# Patient Record
Sex: MALE | URBAN - METROPOLITAN AREA
[De-identification: names, ages, dates, MRNs, and addresses within clinical notes are randomized per-mention and may not be internally consistent; named-entity substitution may affect disease eponyms.]

---

## 2024-02-02 ENCOUNTER — TELEPHONE (OUTPATIENT)
Dept: PRIMARY CARE | Facility: CLINIC | Age: 54
End: 2024-02-02

## 2024-02-02 DIAGNOSIS — Z00.00 WELLNESS EXAMINATION: Primary | ICD-10-CM

## 2024-02-02 PROBLEM — N40.1 BPH WITH OBSTRUCTION/LOWER URINARY TRACT SYMPTOMS: Status: ACTIVE | Noted: 2019-05-20

## 2024-02-02 PROBLEM — N13.8 BPH WITH OBSTRUCTION/LOWER URINARY TRACT SYMPTOMS: Status: ACTIVE | Noted: 2019-05-20

## 2024-02-02 NOTE — TELEPHONE ENCOUNTER
Most recent physical at [facility]: 5/25/2023    I recommend physical for patient sometime in May/June  Fasting lab work has been ordered.    Donovan Gooden MD

## 2024-02-05 NOTE — TELEPHONE ENCOUNTER
Patient was informed to schedule a CPE in May/Liz and to have fasting labs drawn prior.  He will call back to schedule.

## 2024-08-08 RX ORDER — ATENOLOL AND CHLORTHALIDONE TABLET 100; 25 MG/1; MG/1
1 TABLET ORAL
COMMUNITY
Start: 2023-10-05 | End: 2024-08-09 | Stop reason: SDUPTHER

## 2024-08-08 RX ORDER — FENOFIBRATE 145 MG/1
145 TABLET, FILM COATED ORAL
COMMUNITY
Start: 2023-10-05 | End: 2024-08-09 | Stop reason: SDUPTHER

## 2024-08-08 RX ORDER — FLUTICASONE PROPIONATE 50 MCG
SPRAY, SUSPENSION (ML) NASAL
COMMUNITY
Start: 2022-05-23

## 2024-08-08 RX ORDER — TAMSULOSIN HYDROCHLORIDE 0.4 MG/1
0.4 CAPSULE ORAL
COMMUNITY
Start: 2023-10-05 | End: 2024-08-09 | Stop reason: SDUPTHER

## 2024-08-08 RX ORDER — ALBUTEROL SULFATE 90 UG/1
2 INHALANT RESPIRATORY (INHALATION) EVERY 6 HOURS PRN
COMMUNITY
Start: 2019-05-20 | End: 2024-08-09 | Stop reason: SDUPTHER

## 2024-08-08 RX ORDER — SIMVASTATIN 40 MG/1
40 TABLET, FILM COATED ORAL
COMMUNITY
Start: 2023-10-05 | End: 2024-08-09 | Stop reason: SDUPTHER

## 2024-08-08 RX ORDER — KETOCONAZOLE 20 MG/G
1 CREAM TOPICAL 2 TIMES DAILY
COMMUNITY
Start: 2022-05-23

## 2024-08-09 DIAGNOSIS — J45.909 ASTHMA, UNSPECIFIED ASTHMA SEVERITY, UNSPECIFIED WHETHER COMPLICATED, UNSPECIFIED WHETHER PERSISTENT (HHS-HCC): ICD-10-CM

## 2024-08-09 DIAGNOSIS — E78.5 HYPERLIPIDEMIA, UNSPECIFIED HYPERLIPIDEMIA TYPE: ICD-10-CM

## 2024-08-09 DIAGNOSIS — N13.8 BPH WITH OBSTRUCTION/LOWER URINARY TRACT SYMPTOMS: ICD-10-CM

## 2024-08-09 DIAGNOSIS — Z00.00 WELLNESS EXAMINATION: ICD-10-CM

## 2024-08-09 DIAGNOSIS — I10 ESSENTIAL HYPERTENSION, BENIGN: ICD-10-CM

## 2024-08-09 DIAGNOSIS — N40.1 BPH WITH OBSTRUCTION/LOWER URINARY TRACT SYMPTOMS: ICD-10-CM

## 2024-08-09 RX ORDER — FENOFIBRATE 145 MG/1
145 TABLET, FILM COATED ORAL
Qty: 30 TABLET | Refills: 1 | Status: SHIPPED | OUTPATIENT
Start: 2024-08-09

## 2024-08-09 RX ORDER — SIMVASTATIN 40 MG/1
40 TABLET, FILM COATED ORAL
Qty: 30 TABLET | Refills: 1 | Status: SHIPPED | OUTPATIENT
Start: 2024-08-09

## 2024-08-09 RX ORDER — ATENOLOL AND CHLORTHALIDONE TABLET 100; 25 MG/1; MG/1
1 TABLET ORAL DAILY
Qty: 30 TABLET | Refills: 1 | Status: SHIPPED | OUTPATIENT
Start: 2024-08-09

## 2024-08-09 RX ORDER — TAMSULOSIN HYDROCHLORIDE 0.4 MG/1
0.4 CAPSULE ORAL
Qty: 30 CAPSULE | Refills: 1 | Status: SHIPPED | OUTPATIENT
Start: 2024-08-09

## 2024-08-09 RX ORDER — ALBUTEROL SULFATE 90 UG/1
2 INHALANT RESPIRATORY (INHALATION) EVERY 6 HOURS PRN
Qty: 18 G | Refills: 3 | Status: SHIPPED | OUTPATIENT
Start: 2024-08-09

## 2024-09-03 ENCOUNTER — OFFICE VISIT (OUTPATIENT)
Dept: PRIMARY CARE | Facility: CLINIC | Age: 54
End: 2024-09-03
Payer: COMMERCIAL

## 2024-09-03 ENCOUNTER — TELEPHONE (OUTPATIENT)
Dept: PRIMARY CARE | Facility: CLINIC | Age: 54
End: 2024-09-03

## 2024-09-03 VITALS
HEART RATE: 55 BPM | TEMPERATURE: 97.9 F | SYSTOLIC BLOOD PRESSURE: 144 MMHG | DIASTOLIC BLOOD PRESSURE: 86 MMHG | WEIGHT: 213 LBS | OXYGEN SATURATION: 96 %

## 2024-09-03 DIAGNOSIS — N40.1 BPH WITH OBSTRUCTION/LOWER URINARY TRACT SYMPTOMS: ICD-10-CM

## 2024-09-03 DIAGNOSIS — I10 PRIMARY HYPERTENSION: ICD-10-CM

## 2024-09-03 DIAGNOSIS — L02.211 CUTANEOUS ABSCESS OF ABDOMINAL WALL: Primary | ICD-10-CM

## 2024-09-03 DIAGNOSIS — N13.8 BPH WITH OBSTRUCTION/LOWER URINARY TRACT SYMPTOMS: ICD-10-CM

## 2024-09-03 PROCEDURE — 3077F SYST BP >= 140 MM HG: CPT | Performed by: INTERNAL MEDICINE

## 2024-09-03 PROCEDURE — 87070 CULTURE OTHR SPECIMN AEROBIC: CPT

## 2024-09-03 PROCEDURE — 99203 OFFICE O/P NEW LOW 30 MIN: CPT | Performed by: INTERNAL MEDICINE

## 2024-09-03 PROCEDURE — 87075 CULTR BACTERIA EXCEPT BLOOD: CPT

## 2024-09-03 PROCEDURE — 3079F DIAST BP 80-89 MM HG: CPT | Performed by: INTERNAL MEDICINE

## 2024-09-03 RX ORDER — DOXYCYCLINE 100 MG/1
100 CAPSULE ORAL 2 TIMES DAILY
Qty: 20 CAPSULE | Refills: 0 | Status: SHIPPED | OUTPATIENT
Start: 2024-09-03 | End: 2024-09-13

## 2024-09-03 NOTE — TELEPHONE ENCOUNTER
Dennis complains of boils in his groin area.  One started about 1 mo ago and now there's 3 (cluster).  Groin area is red, swollen, very painful.  Please send antibiotics to [facility]. Should I scheduled OV? Call [phone]

## 2024-09-03 NOTE — PROGRESS NOTES
Subjective   Patient ID: Sekou Negron is a 54 y.o. male who presents for Skin Problem (boil).    Same-day visit  (New patient, first visit)    Recurrent cutaneous cysts/furuncles  In the last 2 months, patient has had recurrent cutaneous cysts/pustules involving the groin area.  He has had perhaps 5 or 6 of these episodes.  He has been treating with compresses, drainage, and Neosporin topical antibiotic.  He has a more longstanding history of tinea cruris for which he intermittently uses diluted bleach to manage.  Most recently, cyst/pustule on suprapubic area which was initially the size of a golf ball, then in the last 24 hours drained considerably with relief of pain and reduction in size.  Patient has also noted left nares pustule in the last month, resolved    Essential hypertension  Stable on treatment, no recent change in medications.  Home blood pressure monitoring recommended.  Blood pressure today slightly above goal.  Patient suggests history of whitecoat hypertension    BPH  Current treatment with tamsulosin 0.4 mg daily.  Patient states that his urinary stream remains slow with a significant degree of hesitancy.  Previously, urology has suggested urologic procedure which was initially scheduled, then canceled.  No previous use of finasteride, discussed today.    Migraine headaches  Onset when patient was in his 20s.  Use of atenolol was in part for migraines.  No recent migraines.    Follow-up  Wellness exam/physical in 10/2024  (Fasting lab work to be done 3 to 5 days prior)           Review of Systems   Constitutional:  Negative for chills, fatigue and fever.   Respiratory:  Negative for cough and shortness of breath.    Gastrointestinal:  Negative for constipation and diarrhea.   Genitourinary:  Negative for dysuria and urgency.   Musculoskeletal:  Negative for arthralgias.   Skin:  Positive for wound.   Neurological:  Negative for dizziness and weakness.       Objective   /86 (BP Location:  Left arm, Patient Position: Sitting, BP Cuff Size: Adult)   Pulse 55   Temp 36.6 °C (97.9 °F)   Wt 96.6 kg (213 lb)   SpO2 96%     Physical Exam  Vitals reviewed.   HENT:      Right Ear: Tympanic membrane normal.      Left Ear: Tympanic membrane normal.   Cardiovascular:      Rate and Rhythm: Normal rate and regular rhythm.   Pulmonary:      Effort: Pulmonary effort is normal.      Breath sounds: Normal breath sounds.   Musculoskeletal:      Right lower leg: No edema.      Left lower leg: No edema.   Skin:     Comments: Pustule in suprapubic area with purulent drainage expressed.  Mild rim of erythema.  Moderately tender to palpation.         Assessment/Plan     Recurrent cutaneous cysts/furuncles, currently involving suprapubic area  Swab for bacterial culture sent  Doxycycline 100 mg twice a day for 10 days  Routine skin care including warm compresses as needed, sterile bandage for drainage as needed, daily cleansing with antibacterial soap    Essential hypertension  Continue current medication regimen  Monitor and bring in home blood pressure readings at home blood pressure advised    BPH  Continue tamsulosin 0.4 mg daily  Option of second agent, finasteride 5 mg daily, discussed    Follow-up  Wellness exam/physical in 10/2024  (Fasting lab work to be done 3 to 5 days prior)    Donovan Gooden MD

## 2024-09-03 NOTE — PATIENT INSTRUCTIONS
Recurrent cutaneous cysts/furuncles, currently involving suprapubic area  Swab for bacterial culture sent  Doxycycline 100 mg twice a day for 10 days  Routine skin care including warm compresses as needed, sterile bandage for drainage as needed, daily cleansing with antibacterial soap    Essential hypertension  Continue current medication regimen  Monitor and bring in home blood pressure readings at home blood pressure advised    BPH  Continue tamsulosin 0.4 mg daily  Option of second agent, finasteride 5 mg daily, discussed    Follow-up  Wellness exam/physical in 10/2024  (Fasting lab work to be done 3 to 5 days prior)   normal (ped)...

## 2024-09-04 ASSESSMENT — ENCOUNTER SYMPTOMS
FATIGUE: 0
SHORTNESS OF BREATH: 0
FEVER: 0
CHILLS: 0
DIARRHEA: 0
WOUND: 1
DIZZINESS: 0
ARTHRALGIAS: 0
CONSTIPATION: 0
COUGH: 0
DYSURIA: 0
WEAKNESS: 0

## 2024-09-06 LAB
BACTERIA SPEC CULT: ABNORMAL
GRAM STN SPEC: ABNORMAL
GRAM STN SPEC: ABNORMAL

## 2024-10-08 ENCOUNTER — LAB (OUTPATIENT)
Dept: LAB | Facility: LAB | Age: 54
End: 2024-10-08
Payer: COMMERCIAL

## 2024-10-08 DIAGNOSIS — Z00.00 WELLNESS EXAMINATION: ICD-10-CM

## 2024-10-08 LAB
25(OH)D3 SERPL-MCNC: 26 NG/ML (ref 30–100)
ALBUMIN SERPL BCP-MCNC: 4.8 G/DL (ref 3.4–5)
ALP SERPL-CCNC: 31 U/L (ref 33–120)
ALT SERPL W P-5'-P-CCNC: 26 U/L (ref 10–52)
ANION GAP SERPL CALC-SCNC: 15 MMOL/L (ref 10–20)
APPEARANCE UR: ABNORMAL
AST SERPL W P-5'-P-CCNC: 23 U/L (ref 9–39)
BASOPHILS # BLD AUTO: 0.06 X10*3/UL (ref 0–0.1)
BASOPHILS NFR BLD AUTO: 1 %
BILIRUB SERPL-MCNC: 0.7 MG/DL (ref 0–1.2)
BILIRUB UR STRIP.AUTO-MCNC: NEGATIVE MG/DL
BUN SERPL-MCNC: 18 MG/DL (ref 6–23)
CALCIUM SERPL-MCNC: 10.4 MG/DL (ref 8.6–10.6)
CHLORIDE SERPL-SCNC: 95 MMOL/L (ref 98–107)
CHOLEST SERPL-MCNC: 192 MG/DL (ref 0–199)
CHOLESTEROL/HDL RATIO: 3.6
CO2 SERPL-SCNC: 33 MMOL/L (ref 21–32)
COLOR UR: YELLOW
CREAT SERPL-MCNC: 0.97 MG/DL (ref 0.5–1.3)
CRP SERPL HS-MCNC: 14 MG/L
EGFRCR SERPLBLD CKD-EPI 2021: >90 ML/MIN/1.73M*2
EOSINOPHIL # BLD AUTO: 0.34 X10*3/UL (ref 0–0.7)
EOSINOPHIL NFR BLD AUTO: 5.9 %
ERYTHROCYTE [DISTWIDTH] IN BLOOD BY AUTOMATED COUNT: 13.4 % (ref 11.5–14.5)
EST. AVERAGE GLUCOSE BLD GHB EST-MCNC: 94 MG/DL
GLUCOSE SERPL-MCNC: 100 MG/DL (ref 74–99)
GLUCOSE UR STRIP.AUTO-MCNC: NORMAL MG/DL
HBA1C MFR BLD: 4.9 %
HCT VFR BLD AUTO: 47.4 % (ref 41–52)
HDLC SERPL-MCNC: 53.8 MG/DL
HGB BLD-MCNC: 16.2 G/DL (ref 13.5–17.5)
IMM GRANULOCYTES # BLD AUTO: 0.02 X10*3/UL (ref 0–0.7)
IMM GRANULOCYTES NFR BLD AUTO: 0.3 % (ref 0–0.9)
KETONES UR STRIP.AUTO-MCNC: NEGATIVE MG/DL
LDLC SERPL CALC-MCNC: 121 MG/DL
LEUKOCYTE ESTERASE UR QL STRIP.AUTO: NEGATIVE
LYMPHOCYTES # BLD AUTO: 1.31 X10*3/UL (ref 1.2–4.8)
LYMPHOCYTES NFR BLD AUTO: 22.8 %
MCH RBC QN AUTO: 31.1 PG (ref 26–34)
MCHC RBC AUTO-ENTMCNC: 34.2 G/DL (ref 32–36)
MCV RBC AUTO: 91 FL (ref 80–100)
MONOCYTES # BLD AUTO: 0.58 X10*3/UL (ref 0.1–1)
MONOCYTES NFR BLD AUTO: 10.1 %
MUCOUS THREADS #/AREA URNS AUTO: ABNORMAL /LPF
NEUTROPHILS # BLD AUTO: 3.43 X10*3/UL (ref 1.2–7.7)
NEUTROPHILS NFR BLD AUTO: 59.9 %
NITRITE UR QL STRIP.AUTO: NEGATIVE
NON HDL CHOLESTEROL: 138 MG/DL (ref 0–149)
NRBC BLD-RTO: 0 /100 WBCS (ref 0–0)
PH UR STRIP.AUTO: 7.5 [PH]
PLATELET # BLD AUTO: 208 X10*3/UL (ref 150–450)
POTASSIUM SERPL-SCNC: 3.6 MMOL/L (ref 3.5–5.3)
PROT SERPL-MCNC: 7.8 G/DL (ref 6.4–8.2)
PROT UR STRIP.AUTO-MCNC: ABNORMAL MG/DL
PSA SERPL-MCNC: 1.13 NG/ML
RBC # BLD AUTO: 5.21 X10*6/UL (ref 4.5–5.9)
RBC # UR STRIP.AUTO: NEGATIVE /UL
RBC #/AREA URNS AUTO: ABNORMAL /HPF
SODIUM SERPL-SCNC: 139 MMOL/L (ref 136–145)
SP GR UR STRIP.AUTO: 1.02
TRIGL SERPL-MCNC: 87 MG/DL (ref 0–149)
TSH SERPL-ACNC: 1.21 MIU/L (ref 0.44–3.98)
UROBILINOGEN UR STRIP.AUTO-MCNC: NORMAL MG/DL
VLDL: 17 MG/DL (ref 0–40)
WBC # BLD AUTO: 5.7 X10*3/UL (ref 4.4–11.3)
WBC #/AREA URNS AUTO: ABNORMAL /HPF

## 2024-10-08 PROCEDURE — 85025 COMPLETE CBC W/AUTO DIFF WBC: CPT

## 2024-10-08 PROCEDURE — 84153 ASSAY OF PSA TOTAL: CPT

## 2024-10-08 PROCEDURE — 81001 URINALYSIS AUTO W/SCOPE: CPT

## 2024-10-08 PROCEDURE — 36415 COLL VENOUS BLD VENIPUNCTURE: CPT

## 2024-10-08 PROCEDURE — 84443 ASSAY THYROID STIM HORMONE: CPT

## 2024-10-08 PROCEDURE — 86141 C-REACTIVE PROTEIN HS: CPT

## 2024-10-08 PROCEDURE — 80053 COMPREHEN METABOLIC PANEL: CPT

## 2024-10-08 PROCEDURE — 83036 HEMOGLOBIN GLYCOSYLATED A1C: CPT

## 2024-10-08 PROCEDURE — 80061 LIPID PANEL: CPT

## 2024-10-08 PROCEDURE — 82306 VITAMIN D 25 HYDROXY: CPT

## 2024-10-11 ENCOUNTER — APPOINTMENT (OUTPATIENT)
Dept: PRIMARY CARE | Facility: CLINIC | Age: 54
End: 2024-10-11
Payer: COMMERCIAL

## 2024-10-11 VITALS
BODY MASS INDEX: 29.92 KG/M2 | DIASTOLIC BLOOD PRESSURE: 64 MMHG | HEART RATE: 65 BPM | HEIGHT: 70 IN | WEIGHT: 209 LBS | SYSTOLIC BLOOD PRESSURE: 120 MMHG | OXYGEN SATURATION: 98 %

## 2024-10-11 DIAGNOSIS — D12.6 TUBULAR ADENOMA OF COLON: ICD-10-CM

## 2024-10-11 DIAGNOSIS — G43.109 MIGRAINE WITH AURA AND WITHOUT STATUS MIGRAINOSUS, NOT INTRACTABLE: ICD-10-CM

## 2024-10-11 DIAGNOSIS — Z00.00 WELLNESS EXAMINATION: Primary | ICD-10-CM

## 2024-10-11 DIAGNOSIS — N13.8 BPH WITH OBSTRUCTION/LOWER URINARY TRACT SYMPTOMS: ICD-10-CM

## 2024-10-11 DIAGNOSIS — E55.9 VITAMIN D DEFICIENCY: ICD-10-CM

## 2024-10-11 DIAGNOSIS — I35.1 MILD AORTIC REGURGITATION: ICD-10-CM

## 2024-10-11 DIAGNOSIS — I51.7 ASYMMETRIC SEPTAL HYPERTROPHY: ICD-10-CM

## 2024-10-11 DIAGNOSIS — J06.9 UPPER RESPIRATORY TRACT INFECTION, UNSPECIFIED TYPE: ICD-10-CM

## 2024-10-11 DIAGNOSIS — N40.1 BPH WITH OBSTRUCTION/LOWER URINARY TRACT SYMPTOMS: ICD-10-CM

## 2024-10-11 DIAGNOSIS — J45.20 MILD INTERMITTENT ASTHMA WITHOUT COMPLICATION (HHS-HCC): ICD-10-CM

## 2024-10-11 DIAGNOSIS — Z23 FLU VACCINE NEED: ICD-10-CM

## 2024-10-11 DIAGNOSIS — J30.2 SEASONAL ALLERGIC RHINITIS, UNSPECIFIED TRIGGER: ICD-10-CM

## 2024-10-11 DIAGNOSIS — I10 PRIMARY HYPERTENSION: ICD-10-CM

## 2024-10-11 DIAGNOSIS — R31.29 MICROSCOPIC HEMATURIA: ICD-10-CM

## 2024-10-11 DIAGNOSIS — E78.2 MIXED HYPERLIPIDEMIA: ICD-10-CM

## 2024-10-11 RX ORDER — ATENOLOL AND CHLORTHALIDONE TABLET 100; 25 MG/1; MG/1
1 TABLET ORAL DAILY
Qty: 90 TABLET | Refills: 1 | Status: SHIPPED | OUTPATIENT
Start: 2024-10-11

## 2024-10-11 RX ORDER — SIMVASTATIN 40 MG/1
40 TABLET, FILM COATED ORAL
Qty: 90 TABLET | Refills: 1 | Status: SHIPPED | OUTPATIENT
Start: 2024-10-11

## 2024-10-11 RX ORDER — FENOFIBRATE 145 MG/1
145 TABLET, FILM COATED ORAL
Qty: 90 TABLET | Refills: 1 | Status: CANCELLED | OUTPATIENT
Start: 2024-10-11

## 2024-10-11 RX ORDER — AZITHROMYCIN 250 MG/1
TABLET, FILM COATED ORAL
Qty: 6 TABLET | Refills: 0 | Status: SHIPPED | OUTPATIENT
Start: 2024-10-11 | End: 2024-10-16

## 2024-10-11 RX ORDER — TAMSULOSIN HYDROCHLORIDE 0.4 MG/1
0.4 CAPSULE ORAL
Qty: 90 CAPSULE | Refills: 1 | Status: SHIPPED | OUTPATIENT
Start: 2024-10-11

## 2024-10-11 RX ORDER — ACETAMINOPHEN 500 MG
2000 TABLET ORAL DAILY
Start: 2024-10-11

## 2024-10-11 NOTE — PATIENT INSTRUCTIONS
Wellness exam/physical  Regular exercise with goal of 120-150 minutes/week recommended  Well-balanced diet rich in fruits, vegetables, fiber, lean protein recommended  Influenza vaccine given today  Patient to schedule routine ophthalmology exam  Continued routine follow-up with dentistry recommended    Primary hypertension  Stable.  Continue atenolol/chlorthalidone at current dose  Maintain low-salt diet and get regular aerobic exercise  Monitor home blood pressure readings 2-3 times monthly to bring in readings and device to next visit  Goal for blood pressure is under 130/80    Mixed hyperlipidemia  Increase simvastatin from 20 to 40 mg daily  Discontinue fenofibrate  Lipid panel/comprehensive metabolic panel in January ordered  Maintain low-fat/cholesterol diet  Regular exercise recommended    Septal hypertrophy  Mild mitral regurgitation  (Most recent echocardiogram in 5/2018)  Surveillance echocardiogram ordered    Upper respiratory infection  Monitor symptoms  If worsening symptoms, patient to contact office for antibiotic course (azithromycin)    Mild intermittent asthma  Continue albuterol inhaler as needed    Seasonal allergic rhinitis  Continue Flonase nasal spray as needed    BPH with moderate symptoms  Microscopic hematuria (previous negative evaluation at [facility])  Continue tamsulosin 0.4 mg daily  Referral to urology, Dr. James, for consultation    Vitamin D deficiency  Start over-the-counter vitamin D 2000 IU daily (with food)  Vitamin D level to be checked in January    History of colon polyps  Next screening colonoscopy due in 10/2028 (5-year follow-up)    Migraine headaches  Continue current treatment as needed    Follow-up  1.  Office visit in 4/2025 (BP check, bring in blood pressure device)  2.  Wellness exam/physical in 1 year, on/after 10/11/2025

## 2024-10-11 NOTE — PROGRESS NOTES
Subjective   Patient ID: Sekou Negron is a 54 y.o. male who presents for Annual Exam.    Wellness exam/physical    Primary hypertension  Diagnosed at least 10 years ago.  Same therapy with atenolol/chlorthalidone since that time.  Potential use of atenolol given history of migraines.    Mixed hyperlipidemia  Lipid panel reviewed.  Patient has been on simvastatin 20 mg daily and fenofibrate 145 mg daily for some time.  Triglycerides are normal.  Potential LDL goal of 100.  Do not have family history given he was adopted.  Option of discontinuing fenofibrate, increasing simvastatin or switching to alternative statin such as rosuvastatin discussed.  Plan will be to increase simvastatin and recheck lipid panel in 3 months.  Exercising regularly twice a week, maintaining proper diet having lost 60 pounds since 2021    Septal hypertrophy and mild mitral regurgitation noted on echocardiogram completed in 5/2018.  No symptoms of dyspnea on exertion, syncope or near syncope, orthopnea, PND, chest pain.  Systolic heart murmur noted on exam today.    Upper respiratory infection  Symptoms began 1 week ago including head congestion and cough which is productive of yellow sputum.  Incidentally, C-reactive protein is elevated likely secondary to illness.    Mild intermittent asthma  Originally diagnosed as a teenager with exercise-induced asthma.  Subsequently, patient has noted seasonal wheezing and symptoms, typically in the fall.  There is associated rhinitis as well.  Currently, doing well.  No need for albuterol rescue inhaler    Seasonal allergic rhinitis  Patient has seasonal allergies, typically in the fall.  Flonase is effective, used as needed    BPH with moderate symptoms  Microscopic hematuria (previous negative evaluation at [facility])  Significant diminishment in stream.  No hesitancy, dribbling is present, nocturia 1 time.  Treatment with tamsulosin 0.4 mg daily.  Has previously seen urology at [facility].  Patient agreeable  to consult with urology to discuss treatment options    Vitamin D deficiency  Vitamin D level is low 26.  No prior diagnosis.  No current supplementation.    History of colon polyps  Initial colonoscopy at age 49 with 13 polyps removed.  3-year follow-up with 3 polyps in 9/2020.  3-year follow-up with no polyps in 10/2023.  Next screening colonoscopy due in 10/2028 (5-year follow-up)    Migraine headaches  Onset in 20s.  Less frequent, perhaps 6 times per year.  Typical headache involves top of the head, occasional prodrome of scotoma.  Associated symptoms include photophobia and nausea.  No obvious precipitants.  Previously treated with triptans which caused side effects.  On atenolol for dual purpose with treatment for hypertension.    Intentional weight loss  Patient has lost 60 pounds since 2021.  Goal is 190 pounds.    Health maintenance  Exercise: Walking 2-3 times per week  Colon cancer screening: Up-to-date, see above  Ophthalmology: Last exam at age 46 and normal.  Dermatology: No current provider  Dentistry: Up-to-date    Social  Born in [place].   with 3 daughters.  Education: [employer]  Occupation: Real estate,              Review of Systems   Constitutional:  Negative for fatigue.   HENT:  Positive for postnasal drip and rhinorrhea.    Respiratory:  Positive for cough. Negative for shortness of breath and wheezing.    Cardiovascular:  Negative for chest pain, palpitations and leg swelling.   Gastrointestinal:  Negative for abdominal pain, constipation and diarrhea.   Genitourinary:  Positive for decreased urine volume. Negative for dysuria, frequency and urgency.   Musculoskeletal:  Negative for arthralgias, back pain and myalgias.   Skin:  Negative for rash.   Neurological:  Negative for dizziness, weakness and headaches.   Psychiatric/Behavioral:  Negative for dysphoric mood and sleep disturbance. The patient is not nervous/anxious.        Objective   /64 (BP  "Location: Left arm, Patient Position: Sitting, BP Cuff Size: Adult)   Pulse 65   Ht 1.778 m (5' 10\")   Wt 94.8 kg (209 lb)   SpO2 98%   BMI 29.99 kg/m²     Physical Exam  Vitals reviewed.   HENT:      Head: Normocephalic.      Right Ear: Tympanic membrane normal.      Left Ear: Tympanic membrane normal.      Mouth/Throat:      Mouth: Mucous membranes are moist.   Eyes:      Conjunctiva/sclera: Conjunctivae normal.      Pupils: Pupils are equal, round, and reactive to light.   Neck:      Vascular: No carotid bruit.   Cardiovascular:      Rate and Rhythm: Normal rate and regular rhythm.      Pulses: Normal pulses.      Heart sounds: Murmur heard.      Systolic murmur is present with a grade of 2/6.   Pulmonary:      Effort: Pulmonary effort is normal.      Breath sounds: Normal breath sounds. No wheezing.   Abdominal:      General: Bowel sounds are normal.      Palpations: Abdomen is soft.      Tenderness: There is no abdominal tenderness.   Genitourinary:     Comments: Prostate exam deferred, to see urology for consult  Musculoskeletal:         General: Normal range of motion.      Right lower leg: No edema.      Left lower leg: No edema.   Lymphadenopathy:      Cervical: No cervical adenopathy.   Skin:     General: Skin is warm.   Neurological:      General: No focal deficit present.      Mental Status: He is alert and oriented to person, place, and time.   Psychiatric:         Mood and Affect: Mood normal.         Assessment/Plan     Wellness exam/physical  Regular exercise with goal of 120-150 minutes/week recommended  Well-balanced diet rich in fruits, vegetables, fiber, lean protein recommended  Influenza vaccine given today  Patient to schedule routine ophthalmology exam  Continued routine follow-up with dentistry recommended    Primary hypertension  Stable.  Continue atenolol/chlorthalidone at current dose  Maintain low-salt diet and get regular aerobic exercise  Monitor home blood pressure readings 2-3 " times monthly to bring in readings and device to next visit  Goal for blood pressure is under 130/80    Mixed hyperlipidemia  Increase simvastatin from 20 to 40 mg daily  Discontinue fenofibrate  Lipid panel/comprehensive metabolic panel in January ordered  Maintain low-fat/cholesterol diet  Regular exercise recommended    Septal hypertrophy  Mild mitral regurgitation  (Most recent echocardiogram in 5/2018)  Surveillance echocardiogram ordered    Upper respiratory infection  Monitor symptoms  If worsening symptoms, patient to contact office for antibiotic course (azithromycin)    Mild intermittent asthma  Continue albuterol inhaler as needed    Seasonal allergic rhinitis  Continue Flonase nasal spray as needed    BPH with moderate symptoms  Microscopic hematuria (previous negative evaluation at [facility])  Continue tamsulosin 0.4 mg daily  Referral to urology, Dr. James, for consultation    Vitamin D deficiency  Start over-the-counter vitamin D 2000 IU daily (with food)  Vitamin D level to be checked in January    History of colon polyps  Next screening colonoscopy due in 10/2028 (5-year follow-up)    Migraine headaches  Continue current treatment as needed    Follow-up  1.  Office visit in 4/2025 (BP check, bring in blood pressure device)  2.  Wellness exam/physical in 1 year, on/after 10/11/2025    Donovan Gooden MD

## 2024-10-17 ASSESSMENT — ENCOUNTER SYMPTOMS
SHORTNESS OF BREATH: 0
ABDOMINAL PAIN: 0
ARTHRALGIAS: 0
FATIGUE: 0
WEAKNESS: 0
RHINORRHEA: 1
SLEEP DISTURBANCE: 0
WHEEZING: 0
MYALGIAS: 0
PALPITATIONS: 0
DYSURIA: 0
BACK PAIN: 0
DYSPHORIC MOOD: 0
DIZZINESS: 0
NERVOUS/ANXIOUS: 0
FREQUENCY: 0
CONSTIPATION: 0
COUGH: 1
HEADACHES: 0
DIARRHEA: 0

## 2024-11-12 ENCOUNTER — HOSPITAL ENCOUNTER (OUTPATIENT)
Dept: CARDIOLOGY | Facility: CLINIC | Age: 54
Discharge: HOME | End: 2024-11-12
Payer: COMMERCIAL

## 2024-11-12 DIAGNOSIS — I35.1 MILD AORTIC REGURGITATION: ICD-10-CM

## 2024-11-12 DIAGNOSIS — I51.7 ASYMMETRIC SEPTAL HYPERTROPHY: ICD-10-CM

## 2024-11-12 PROCEDURE — 93306 TTE W/DOPPLER COMPLETE: CPT | Performed by: STUDENT IN AN ORGANIZED HEALTH CARE EDUCATION/TRAINING PROGRAM

## 2024-11-12 PROCEDURE — 93306 TTE W/DOPPLER COMPLETE: CPT

## 2024-11-15 ENCOUNTER — OFFICE VISIT (OUTPATIENT)
Dept: UROLOGY | Facility: HOSPITAL | Age: 54
End: 2024-11-15
Payer: COMMERCIAL

## 2024-11-15 DIAGNOSIS — N13.8 BPH WITH OBSTRUCTION/LOWER URINARY TRACT SYMPTOMS: ICD-10-CM

## 2024-11-15 DIAGNOSIS — N40.1 BPH WITH OBSTRUCTION/LOWER URINARY TRACT SYMPTOMS: ICD-10-CM

## 2024-11-15 DIAGNOSIS — R39.12 WEAK URINARY STREAM: Primary | ICD-10-CM

## 2024-11-15 LAB
AORTIC VALVE MEAN GRADIENT: 11 MMHG
AORTIC VALVE PEAK VELOCITY: 2.22 M/S
AV PEAK GRADIENT: 20 MMHG
AVA (PEAK VEL): 1.43 CM2
AVA (VTI): 1.65 CM2
EJECTION FRACTION APICAL 4 CHAMBER: 68.5
EJECTION FRACTION: 63 %
LEFT ATRIUM VOLUME AREA LENGTH INDEX BSA: 24.3 ML/M2
LEFT VENTRICLE INTERNAL DIMENSION DIASTOLE: 5.9 CM (ref 3.5–6)
LEFT VENTRICULAR OUTFLOW TRACT DIAMETER: 2.08 CM
MITRAL VALVE E/A RATIO: 1.18
RIGHT VENTRICLE FREE WALL PEAK S': 9 CM/S
RIGHT VENTRICLE PEAK SYSTOLIC PRESSURE: 26 MMHG
TRICUSPID ANNULAR PLANE SYSTOLIC EXCURSION: 2.3 CM

## 2024-11-15 PROCEDURE — 99214 OFFICE O/P EST MOD 30 MIN: CPT | Performed by: UROLOGY

## 2024-11-15 PROCEDURE — 99204 OFFICE O/P NEW MOD 45 MIN: CPT | Performed by: UROLOGY

## 2024-11-15 PROCEDURE — 1036F TOBACCO NON-USER: CPT | Performed by: UROLOGY

## 2024-11-15 PROCEDURE — 99417 PROLNG OP E/M EACH 15 MIN: CPT | Performed by: UROLOGY

## 2024-11-15 PROCEDURE — 51798 US URINE CAPACITY MEASURE: CPT | Performed by: UROLOGY

## 2024-11-15 NOTE — PROGRESS NOTES
HPI    54 y.o. male being seen with the following problem list:    Problem list:  BPH with moderate symptoms on Flomax 0.4mg  Microscopic hematuria (previous negative evaluation at [facility])    Significant diminishment in stream.  No hesitancy, dribbling is present, nocturia 1 time. On tamsulosin 0.4 mg daily.  Has previously seen urology at [facility].      10/8/24 microUA -RBC 6-10/hpf    6/2021 TRUS 35g    PSA  10/2024 - 1.1   1/2024 - 1.2  10/2023 - 3.3  8/2023 - 5.6  5/2023 - 2.1  5/2022 - 1.2  5/2021 - 1.8  4/2021 - 2.2    11/15/24 - PVR 0cc. Urinary symptoms include weak stream, straining/hesitancy, urgency, frequency and incontinence. On Flomax 0.4mg. No UTI or retention.     Current Medications:  Current Outpatient Medications   Medication Sig Dispense Refill    albuterol 90 mcg/actuation inhaler Inhale 2 puffs every 6 hours if needed for wheezing. 18 g 3    atenoloL-chlorthalidone (Tenoretic) 100-25 mg tablet Take 1 tablet by mouth once daily. 90 tablet 1    cholecalciferol (Vitamin D3) 50 mcg (2,000 unit) capsule Take 1 capsule (50 mcg) by mouth once daily. (With food)      fluticasone (Flonase) 50 mcg/actuation nasal spray Administer 2 sprays into each nostril once daily. As needed for allergies      simvastatin (Zocor) 40 mg tablet Take 1 tablet (40 mg) by mouth once daily. 90 tablet 1    tamsulosin (Flomax) 0.4 mg 24 hr capsule Take 1 capsule (0.4 mg) by mouth once daily. 90 capsule 1     No current facility-administered medications for this visit.        Active Problems:  Sekou Negron is a 54 y.o. male with the following Problems and Medications.  Patient Active Problem List   Diagnosis    Seasonal allergic rhinitis    Mild intermittent asthma without complication ([facility])    BPH with obstruction/lower urinary tract symptoms    Celiac disease ([facility])    Primary hypertension    Mixed hyperlipidemia    Migraine with aura and without status migrainosus, not intractable    Vitamin D deficiency    Tubular adenoma  of colon    Microscopic hematuria    Mild aortic regurgitation    Asymmetric septal hypertrophy     Current Outpatient Medications   Medication Sig Dispense Refill    albuterol 90 mcg/actuation inhaler Inhale 2 puffs every 6 hours if needed for wheezing. 18 g 3    atenoloL-chlorthalidone (Tenoretic) 100-25 mg tablet Take 1 tablet by mouth once daily. 90 tablet 1    cholecalciferol (Vitamin D3) 50 mcg (2,000 unit) capsule Take 1 capsule (50 mcg) by mouth once daily. (With food)      fluticasone (Flonase) 50 mcg/actuation nasal spray Administer 2 sprays into each nostril once daily. As needed for allergies      simvastatin (Zocor) 40 mg tablet Take 1 tablet (40 mg) by mouth once daily. 90 tablet 1    tamsulosin (Flomax) 0.4 mg 24 hr capsule Take 1 capsule (0.4 mg) by mouth once daily. 90 capsule 1     No current facility-administered medications for this visit.       PMH:  No past medical history on file.    PSH:  No past surgical history on file.    FMH:  Family History   Problem Relation Name Age of Onset    Alzheimer's disease Mother Yesica          in     Other (Parkinson Disease) Father Sekou     Other (bladder cancer) Father Sekou     Stroke Father Sekou        SHx:  Social History     Tobacco Use    Smoking status: Never    Smokeless tobacco: Never   Substance Use Topics    Alcohol use: Yes     Comment: 7 drinks per week    Drug use: Yes     Types: Marijuana       Allergies:  No Known Allergies      Assessment/Plan  54 year old male with BPH, obstructive LUTS; straining/hesitancy, weak stream along with irritative symptoms; urgency, frequency with some incontinence. Most bothered by his obstructive symptoms. No history of UTI, retention. I recommend brining him in for a cystoscopy to evaluate his channel. Will discuss treatment options then.     Follow up with kenoSaad Nicolas Attestation  By signing my name below, I, Fidencio Montalvo, attest that this documentation has been prepared under the  direction and in the presence of Barrington James MD.

## 2024-12-09 NOTE — PROGRESS NOTES
HPI    54 y.o. male being seen with the following problem list:    Problem list:  BPH with moderate symptoms on Flomax 0.4mg  Microscopic hematuria (previous negative evaluation at [facility])     Significant diminishment in stream.  No hesitancy, dribbling is present, nocturia 1 time. On tamsulosin 0.4 mg daily.  Has previously seen urology at [facility].       10/8/24 microUA -RBC 6-10/hpf     6/2021 TRUS 35g     11/15/24 - PVR 0cc. Urinary symptoms include weak stream, straining/hesitancy, urgency, frequency and incontinence. On Flomax 0.4mg. No UTI or retention.     12/11/24 -Presents today for cysto. No interval changes     PSA  10/2024 - 1.1   1/2024 - 1.2  10/2023 - 3.3  8/2023 - 5.6  5/2023 - 2.1  5/2022 - 1.2  5/2021 - 1.8  4/2021 - 2.2    Current Medications:  Current Outpatient Medications   Medication Sig Dispense Refill    atenoloL-chlorthalidone (Tenoretic) 100-25 mg tablet Take 1 tablet by mouth once daily. 90 tablet 1    cholecalciferol (Vitamin D3) 50 mcg (2,000 unit) capsule Take 1 capsule (50 mcg) by mouth once daily. (With food)      simvastatin (Zocor) 40 mg tablet Take 1 tablet (40 mg) by mouth once daily. 90 tablet 1    tamsulosin (Flomax) 0.4 mg 24 hr capsule Take 1 capsule (0.4 mg) by mouth once daily. 90 capsule 1    albuterol 90 mcg/actuation inhaler Inhale 2 puffs every 6 hours if needed for wheezing. 18 g 3    fluticasone (Flonase) 50 mcg/actuation nasal spray Administer 2 sprays into each nostril once daily. As needed for allergies       No current facility-administered medications for this visit.        Active Problems:  Sekou Negron is a 54 y.o. male with the following Problems and Medications.  Patient Active Problem List   Diagnosis    Seasonal allergic rhinitis    Mild intermittent asthma without complication ([facility])    BPH with obstruction/lower urinary tract symptoms    Celiac disease ([facility])    Primary hypertension    Mixed hyperlipidemia    Migraine with aura and without status  migrainosus, not intractable    Vitamin D deficiency    Tubular adenoma of colon    Microscopic hematuria    Mild aortic regurgitation    Asymmetric septal hypertrophy    Weak urinary stream     Current Outpatient Medications   Medication Sig Dispense Refill    atenoloL-chlorthalidone (Tenoretic) 100-25 mg tablet Take 1 tablet by mouth once daily. 90 tablet 1    cholecalciferol (Vitamin D3) 50 mcg (2,000 unit) capsule Take 1 capsule (50 mcg) by mouth once daily. (With food)      simvastatin (Zocor) 40 mg tablet Take 1 tablet (40 mg) by mouth once daily. 90 tablet 1    tamsulosin (Flomax) 0.4 mg 24 hr capsule Take 1 capsule (0.4 mg) by mouth once daily. 90 capsule 1    albuterol 90 mcg/actuation inhaler Inhale 2 puffs every 6 hours if needed for wheezing. 18 g 3    fluticasone (Flonase) 50 mcg/actuation nasal spray Administer 2 sprays into each nostril once daily. As needed for allergies       No current facility-administered medications for this visit.       PMH:  No past medical history on file.    PSH:  No past surgical history on file.    FMH:  Family History   Problem Relation Name Age of Onset    Alzheimer's disease Mother Yesica          in     Other (Parkinson Disease) Father Sekou     Other (bladder cancer) Father Sekou     Stroke Father Sekou        SHx:  Social History     Tobacco Use    Smoking status: Never    Smokeless tobacco: Never   Substance Use Topics    Alcohol use: Yes     Comment: 7 drinks per week    Drug use: Yes     Types: Marijuana       Allergies:  No Known Allergies    Procedure:  After informed consent was obtained, the patient was taken to the procedure room for cystoscopy due to weak stream.     Cystoscopy     Procedure Note:    A sterile prep and drape was performed in standard fashion. Lidocaine was used for topical anesthesia. A flexible cystoscope was inserted into the urethra without difficulty revealing normal urethra.     The prostate small, high bladder neck ,primary  bladder neck obstruction     Then entered the bladder revealing bladder mucosa with no erythematous patches or plaques, foreign bodies, stones or papillary lesions. The ureteral orifices were visualized bilaterally. These were orthotopic in location and effluxing clear urine. No masses were seen on retroflexion.     Post-Procedure:   The cystoscope was removed. The vital signs were stable . The patient tolerated the procedure well. There were no complications.         Assessment/Plan  Cysto shows primary bladder neck obstruction, bladder was normal. I recommend middle lobe HoLEP with bladder neck incisions + Botox.     We discussed surgical options for his prostate and bothersome LUTS. We discussed various options including TURP, greenlight, Rezum, Urolift, HoLEP. We discussed HoLEP as a size-independent procedure that maximally de-obstructs the prostate with relatively less postop healing and recovery as compared to other modalities and superior durability. We discussed the surgery in detail. Discussed the risks of bleeding, infection, scarring, transient incontinence, rare (<1%) risk of long-term incontinence. Discussed the perioperative pathway. Discussed the near certainty of permanent ejaculatory dysfunction, but likely no change to his baseline erectile function or orgasm.     We discussed management of his irritative voiding symptoms. Discussed that typically the bladder becomes hyperactive in the setting of long-term obstruction, and that resolution of the obstruction typically allows the bladder to calm down over time. This may not be immediate and can take several months. To mitigate this recovery process, we discussed the use of intradetrusor botox to facilitate better bladder storage. Discussed the mechanism of action, risks of infection, transient incomplete emptying (though likely rare in setting of concurrent outlet procedure), inefficacy, need for additional treatment down the line. Will administer  100U at time of surgery     He understands this and would like to proceed. Will schedule this for him accordingly.       Scribe Attestation  By signing my name below, I, Fidencio Montalvo, attest that this documentation has been prepared under the direction and in the presence of Barrington James MD.

## 2024-12-11 ENCOUNTER — PROCEDURE VISIT (OUTPATIENT)
Dept: UROLOGY | Facility: HOSPITAL | Age: 54
End: 2024-12-11
Payer: COMMERCIAL

## 2024-12-11 ENCOUNTER — PREP FOR PROCEDURE (OUTPATIENT)
Dept: UROLOGY | Facility: HOSPITAL | Age: 54
End: 2024-12-11

## 2024-12-11 VITALS — HEART RATE: 78 BPM | DIASTOLIC BLOOD PRESSURE: 84 MMHG | SYSTOLIC BLOOD PRESSURE: 128 MMHG

## 2024-12-11 DIAGNOSIS — R39.12 WEAK URINARY STREAM: Primary | ICD-10-CM

## 2024-12-11 DIAGNOSIS — N32.81 OAB (OVERACTIVE BLADDER): ICD-10-CM

## 2024-12-11 DIAGNOSIS — N13.8 BPH WITH OBSTRUCTION/LOWER URINARY TRACT SYMPTOMS: ICD-10-CM

## 2024-12-11 DIAGNOSIS — N40.1 BPH WITH OBSTRUCTION/LOWER URINARY TRACT SYMPTOMS: ICD-10-CM

## 2024-12-11 PROCEDURE — 99214 OFFICE O/P EST MOD 30 MIN: CPT | Performed by: UROLOGY

## 2024-12-11 PROCEDURE — 52000 CYSTOURETHROSCOPY: CPT | Performed by: UROLOGY

## 2024-12-11 RX ORDER — CEFAZOLIN SODIUM 2 G/100ML
2 INJECTION, SOLUTION INTRAVENOUS ONCE
OUTPATIENT
Start: 2024-12-11 | End: 2024-12-11

## 2024-12-11 ASSESSMENT — PATIENT HEALTH QUESTIONNAIRE - PHQ9
SUM OF ALL RESPONSES TO PHQ9 QUESTIONS 1 AND 2: 0
2. FEELING DOWN, DEPRESSED OR HOPELESS: NOT AT ALL
1. LITTLE INTEREST OR PLEASURE IN DOING THINGS: NOT AT ALL

## 2024-12-26 ENCOUNTER — TELEPHONE (OUTPATIENT)
Dept: UROLOGY | Facility: HOSPITAL | Age: 54
End: 2024-12-26
Payer: COMMERCIAL

## 2024-12-26 NOTE — TELEPHONE ENCOUNTER
Left pt message to call [facility] department in order to schedule required appts prior to upcoming surgery, also sent mychart message

## 2025-01-07 ENCOUNTER — APPOINTMENT (OUTPATIENT)
Dept: CARDIOLOGY | Facility: CLINIC | Age: 55
End: 2025-01-07
Payer: COMMERCIAL

## 2025-01-07 VITALS
DIASTOLIC BLOOD PRESSURE: 80 MMHG | OXYGEN SATURATION: 98 % | RESPIRATION RATE: 18 BRPM | HEART RATE: 64 BPM | HEIGHT: 70 IN | BODY MASS INDEX: 30.81 KG/M2 | WEIGHT: 215.2 LBS | SYSTOLIC BLOOD PRESSURE: 138 MMHG

## 2025-01-07 DIAGNOSIS — I51.7 ASYMMETRIC SEPTAL HYPERTROPHY: Primary | ICD-10-CM

## 2025-01-07 DIAGNOSIS — R94.31 ABNORMAL EKG: ICD-10-CM

## 2025-01-07 DIAGNOSIS — I42.2 CARDIOMYOPATHY, HYPERTROPHIC (MULTI): ICD-10-CM

## 2025-01-07 DIAGNOSIS — I10 PRIMARY HYPERTENSION: ICD-10-CM

## 2025-01-07 DIAGNOSIS — R01.1 MURMUR, HEART: ICD-10-CM

## 2025-01-07 DIAGNOSIS — I35.1 MILD AORTIC REGURGITATION: ICD-10-CM

## 2025-01-07 PROBLEM — S93.419A SPRAIN OF CALCANEOFIBULAR LIGAMENT: Status: ACTIVE | Noted: 2017-11-14

## 2025-01-07 PROCEDURE — 99204 OFFICE O/P NEW MOD 45 MIN: CPT | Performed by: NURSE PRACTITIONER

## 2025-01-07 PROCEDURE — 3079F DIAST BP 80-89 MM HG: CPT | Performed by: NURSE PRACTITIONER

## 2025-01-07 PROCEDURE — 3008F BODY MASS INDEX DOCD: CPT | Performed by: NURSE PRACTITIONER

## 2025-01-07 PROCEDURE — 3075F SYST BP GE 130 - 139MM HG: CPT | Performed by: NURSE PRACTITIONER

## 2025-01-07 PROCEDURE — 1036F TOBACCO NON-USER: CPT | Performed by: NURSE PRACTITIONER

## 2025-01-07 PROCEDURE — 93000 ELECTROCARDIOGRAM COMPLETE: CPT | Performed by: NURSE PRACTITIONER

## 2025-01-07 NOTE — LETTER
2025     Donovan Gooden MD  [address]  [address] [address]    Patient: Sekou Negron   YOB: 1970   Date of Visit: 2025       Dear Dr. Donovan Gooden MD:    Thank you for referring Sekou Negron to me for evaluation. Below are my notes for this consultation.  If you have questions, please do not hesitate to call me. I look forward to following your patient along with you.       Sincerely,     Tracy M Schwab, APRN-CNP      CC: No Recipients  ______________________________________________________________________________________    Name : Sekou Negron   : 1970   MRN : [id number]   ENC Date : 2025    CC:   CV Evaluation, Aortic Regurg, Hypertension, Hyperlipidemia, and Asymmetric Septal Hypertophy     HPI:    Sekou Negron is a 54 y.o. male with PMHx sig for HTN, HLD, MR, septal hypertrophy, asthma & BPH who presents today on referral from Dr. Gooden for abnormal echocardiogram.    Dennis saw Dr. Gooden in October to establish care. On review of history, echocardiogram done in  showed mild AR & moderate septal LVH. Dennis reports that an abdominal ultrasound done for screening to get life insurance picked up on the LVH which is what lead to his echocardiogram at that time.    Echo done 24 for surveillance showed normal intraventricular and posterior wall dimensions but mentions a sigmoid septum with increased velocity in the LVOT but normal aortic valve opening.     Reports that he was evaluated by cardiologist many years ago for chest pain, did a stress test which turned out normal    No routine exercise, but walks. 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 Never had any abnormal SOB or syncopal episodes.    Does have exercise induced asthma that's been well managed     CV Diagnostics:  Echo 24:   1. Left ventricular ejection fraction is normal, by visual estimate at 60-65%.   2. Abnormally shaped sigmoid septum  "measuring 2.3 cm that could indicate hypertrophic cardiomyopathy. No evidence of MANUEL or cavity/outlet obstruction. There is acceleration across the LVOT with normal opening of the aortic valve.   3. There is normal right ventricular global systolic function.   4. Right ventricular systolic pressure is within normal limits.   5. Mild aortic valve regurgitation.    ROS: unless otherwise noted in the history of present illness, all other systems were reviewed and they are negative for complaints     PMH:  As above    PSH:  noncontributory    SHx:  He reports that he has never smoked. He has never used smokeless tobacco. He reports current alcohol use. He reports current drug use. Drug: Marijuana.    FHx:  Family History   Problem Relation Name Age of Onset   • Alzheimer's disease Mother Yesica          in    • Other (Parkinson Disease) Father Sekou    • Other (bladder cancer) Father Sekou    • Stroke Father Sekou       Allergies:  Patient has no known allergies.    Outpatient Medications:  Current Outpatient Medications   Medication Instructions   • albuterol 90 mcg/actuation inhaler 2 puffs, inhalation, Every 6 hours PRN   • atenoloL-chlorthalidone (Tenoretic) 100-25 mg tablet 1 tablet, oral, Daily   • fluticasone (Flonase) 50 mcg/actuation nasal spray Administer 2 sprays into each nostril once daily. As needed for allergies   • simvastatin (ZOCOR) 40 mg, oral, Daily RT   • tamsulosin (FLOMAX) 0.4 mg, oral, Daily RT     Last Recorded Vitals:  Vitals:    25 0800   BP: 138/80   BP Location: Left arm   Patient Position: Sitting   Pulse: 64   Resp: 18   SpO2: 98%   Weight: 97.6 kg (215 lb 3.2 oz)   Height: 1.778 m (5' 10\")     Physical Exam:  On exam Mr. Sekou Negron appears his stated age, is alert and oriented x3, and in no acute distress. His sclera are anicteric and his oropharynx has moist mucous membranes. His neck is supple and without thyromegaly. The JVP is ~5 cm of water above the right atrium. " His cardiac exam has regular rhythm, normal S1, S2. No S3/4. 2/6 diastolic murmur at RUSB. His lungs are clear to auscultation bilaterally and there is no dullness to percussion. His abdomen is soft, nontender with normoactive bowel sounds. There is no HJR. The extremities are warm and without edema. The skin is dry. There is no rash present. The distal pulses are 2-3+ in all four extremities. His mood and affect are appropriate for todays encounter.      Last Labs:  CBC -  Lab Results   Component Value Date    WBC 5.7 10/08/2024    HGB 16.2 10/08/2024    HCT 47.4 10/08/2024    MCV 91 10/08/2024     10/08/2024       CMP -  Lab Results   Component Value Date    CALCIUM 10.4 10/08/2024    PROT 7.8 10/08/2024    ALBUMIN 4.8 10/08/2024    AST 23 10/08/2024    ALT 26 10/08/2024    ALKPHOS 31 (L) 10/08/2024    BILITOT 0.7 10/08/2024       LIPID PANEL -   Lab Results   Component Value Date    CHOL 192 10/08/2024    TRIG 87 10/08/2024    HDL 53.8 10/08/2024    CHHDL 3.6 10/08/2024    VLDL 17 10/08/2024    NHDL 138 10/08/2024       RENAL FUNCTION PANEL -   Lab Results   Component Value Date    GLUCOSE 100 (H) 10/08/2024     10/08/2024    K 3.6 10/08/2024    CL 95 (L) 10/08/2024    CO2 33 (H) 10/08/2024    ANIONGAP 15 10/08/2024    BUN 18 10/08/2024    CREATININE 0.97 10/08/2024    CALCIUM 10.4 10/08/2024    ALBUMIN 4.8 10/08/2024        Lab Results   Component Value Date    HGBA1C 4.9 10/08/2024     I have reviewed the above labs & diagnostics    Assessment/Plan:  Aortic Valve Insufficiency. Mild by most recent echocardiogram. Asymptomatic    Septal hypertrophy. Sigmoid septum. Notable acceleration across the LVOT on recent echocardiogram. Does have a 2/6 diastolic murmur RUSB which Dennis reports is not new. No c/o FRANCOIS nor syncope. Up until the past couple of years he played Hockey without any cardiac symptoms, though now is much more sedentary. Would further investigate with cMRI to r/o hypertrophic  cardiomyopathy. Heart rate is optimal.     Hypertension. /80 mmHg. A little elevated today. For now, would continue with current regimen & reassess next visit.    Murmur. Consistent with aortic insufficiency. Work up as above    Follow up after cMRI.    Tracy M Schwab, APRN-CNP

## 2025-01-07 NOTE — PROGRESS NOTES
Name : Sekou Negron   : 1970   MRN : [id number]   ENC Date : 2025    CC:   CV Evaluation, Aortic Regurg, Hypertension, Hyperlipidemia, and Asymmetric Septal Hypertophy     HPI:    Sekou Negron is a 54 y.o. male with PMHx sig for HTN, HLD, MR, septal hypertrophy, asthma & BPH who presents today on referral from Dr. Gooden for abnormal echocardiogram.    Dennis saw Dr. Gooden in October to establish care. On review of history, echocardiogram done in  showed mild AR & moderate septal LVH. Dennis reports that an abdominal ultrasound done for screening to get life insurance picked up on the LVH which is what lead to his echocardiogram at that time.    Echo done 24 for surveillance showed normal intraventricular and posterior wall dimensions but mentions a sigmoid septum with increased velocity in the LVOT but normal aortic valve opening.     Reports that he was evaluated by cardiologist many years ago for chest pain, did a stress test which turned out normal    No routine exercise, but walks. 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 Never had any abnormal SOB or syncopal episodes.    Does have exercise induced asthma that's been well managed     CV Diagnostics:  Echo 24:   1. Left ventricular ejection fraction is normal, by visual estimate at 60-65%.   2. Abnormally shaped sigmoid septum measuring 2.3 cm that could indicate hypertrophic cardiomyopathy. No evidence of MANUEL or cavity/outlet obstruction. There is acceleration across the LVOT with normal opening of the aortic valve.   3. There is normal right ventricular global systolic function.   4. Right ventricular systolic pressure is within normal limits.   5. Mild aortic valve regurgitation.    ROS: unless otherwise noted in the history of present illness, all other systems were reviewed and they are negative for complaints     PMH:  As above    PSH:  noncontributory    SHx:  He reports that he has never smoked. He has  "never used smokeless tobacco. He reports current alcohol use. He reports current drug use. Drug: Marijuana.    FHx:  Family History   Problem Relation Name Age of Onset    Alzheimer's disease Mother Yesica          in     Other (Parkinson Disease) Father Sekou     Other (bladder cancer) Father Sekou     Stroke Father Sekou       Allergies:  Patient has no known allergies.    Outpatient Medications:  Current Outpatient Medications   Medication Instructions    albuterol 90 mcg/actuation inhaler 2 puffs, inhalation, Every 6 hours PRN    atenoloL-chlorthalidone (Tenoretic) 100-25 mg tablet 1 tablet, oral, Daily    fluticasone (Flonase) 50 mcg/actuation nasal spray Administer 2 sprays into each nostril once daily. As needed for allergies    simvastatin (ZOCOR) 40 mg, oral, Daily RT    tamsulosin (FLOMAX) 0.4 mg, oral, Daily RT     Last Recorded Vitals:  Vitals:    25 0800   BP: 138/80   BP Location: Left arm   Patient Position: Sitting   Pulse: 64   Resp: 18   SpO2: 98%   Weight: 97.6 kg (215 lb 3.2 oz)   Height: 1.778 m (5' 10\")     Physical Exam:  On exam Mr. Sekou Negron appears his stated age, is alert and oriented x3, and in no acute distress. His sclera are anicteric and his oropharynx has moist mucous membranes. His neck is supple and without thyromegaly. The JVP is ~5 cm of water above the right atrium. His cardiac exam has regular rhythm, normal S1, S2. No S3/4. 2/6 diastolic murmur at RUSB. His lungs are clear to auscultation bilaterally and there is no dullness to percussion. His abdomen is soft, nontender with normoactive bowel sounds. There is no HJR. The extremities are warm and without edema. The skin is dry. There is no rash present. The distal pulses are 2-3+ in all four extremities. His mood and affect are appropriate for todays encounter.      Last Labs:  CBC -  Lab Results   Component Value Date    WBC 5.7 10/08/2024    HGB 16.2 10/08/2024    HCT 47.4 10/08/2024    MCV 91 10/08/2024 "     10/08/2024       CMP -  Lab Results   Component Value Date    CALCIUM 10.4 10/08/2024    PROT 7.8 10/08/2024    ALBUMIN 4.8 10/08/2024    AST 23 10/08/2024    ALT 26 10/08/2024    ALKPHOS 31 (L) 10/08/2024    BILITOT 0.7 10/08/2024       LIPID PANEL -   Lab Results   Component Value Date    CHOL 192 10/08/2024    TRIG 87 10/08/2024    HDL 53.8 10/08/2024    CHHDL 3.6 10/08/2024    VLDL 17 10/08/2024    NHDL 138 10/08/2024       RENAL FUNCTION PANEL -   Lab Results   Component Value Date    GLUCOSE 100 (H) 10/08/2024     10/08/2024    K 3.6 10/08/2024    CL 95 (L) 10/08/2024    CO2 33 (H) 10/08/2024    ANIONGAP 15 10/08/2024    BUN 18 10/08/2024    CREATININE 0.97 10/08/2024    CALCIUM 10.4 10/08/2024    ALBUMIN 4.8 10/08/2024        Lab Results   Component Value Date    HGBA1C 4.9 10/08/2024     I have reviewed the above labs & diagnostics    Assessment/Plan:  Aortic Valve Insufficiency. Mild by most recent echocardiogram. Asymptomatic    Septal hypertrophy. Sigmoid septum. Notable acceleration across the LVOT on recent echocardiogram. Does have a 2/6 diastolic murmur RUSB which Dennis reports is not new. No c/o FRANCOIS nor syncope. Up until the past couple of years he played Hockey without any cardiac symptoms, though now is much more sedentary. Would further investigate with cMRI to r/o hypertrophic cardiomyopathy. Heart rate is optimal.     Hypertension. /80 mmHg. A little elevated today. For now, would continue with current regimen & reassess next visit.    Murmur. Consistent with aortic insufficiency. Work up as above    Follow up after cMRI.    Tracy M Schwab, APRN-CNP

## 2025-01-08 ENCOUNTER — CLINICAL SUPPORT (OUTPATIENT)
Dept: PREADMISSION TESTING | Facility: HOSPITAL | Age: 55
End: 2025-01-08
Payer: COMMERCIAL

## 2025-01-08 DIAGNOSIS — N32.81 OAB (OVERACTIVE BLADDER): ICD-10-CM

## 2025-01-08 DIAGNOSIS — N13.8 BPH WITH OBSTRUCTION/LOWER URINARY TRACT SYMPTOMS: ICD-10-CM

## 2025-01-08 DIAGNOSIS — N40.1 BPH WITH OBSTRUCTION/LOWER URINARY TRACT SYMPTOMS: ICD-10-CM

## 2025-01-08 NOTE — CPM/PAT NURSE NOTE
CPM/PAT Nurse Note      Name: Sekou Negron (Sekou Negron)  /Age: 1970/54 y.o.       Past Medical History:   Diagnosis Date    Asthma     albuterol once/month    Asymmetric septal hypertrophy     BPH (benign prostatic hyperplasia)     Celiac disease ([facility])     childhood    Cutaneous cyst     recurrent - suprapubic    Diverticulitis     1 episode 3 yrs ago    Hyperlipidemia     Hypertension     Hypertrophic cardiomyopathy ([facility])     ECHO 24: 1. Left ventricular ejection fraction is normal, by visual estimate at 60-65%.  2. Abnormally shaped sigmoid septum measuring 2.3 cm that could indicate hypertrophic cardiomyopathy. No evidence of MANUEL or cavity/outlet obstruction. There is acceleration across the LVOT with normal opening of the aortic valve. cMRI scheduled after surgery - pt removed to move up,    Migraine     Mild aortic regurgitation     OAB (overactive bladder)     Pre-operative clearance     cardiac clearance requested, pt being worked up for hypertrophic cardiomyopathy, cMRI sched  - refused to move up.  States cards knows he's having surgery and said fine, cards clearance requested    Seasonal allergic rhinitis     Tubular adenoma     Vitamin D deficiency        Past Surgical History:   Procedure Laterality Date    COLONOSCOPY      UPPER GASTROINTESTINAL ENDOSCOPY         Patient  has no history on file for sexual activity.    Family History   Adopted: Yes       No Known Allergies    Prior to Admission medications    Medication Sig Start Date End Date Taking? Authorizing Provider   albuterol 90 mcg/actuation inhaler Inhale 2 puffs every 6 hours if needed for wheezing. 24   Donovan Gooden MD   atenoloL-chlorthalidone (Tenoretic) 100-25 mg tablet Take 1 tablet by mouth once daily. 10/11/24   Donovan Gooden MD   fluticasone (Flonase) 50 mcg/actuation nasal spray Administer 2 sprays into each nostril once daily. As needed for allergies 22   Historical Provider, MD    simvastatin (Zocor) 40 mg tablet Take 1 tablet (40 mg) by mouth once daily. 10/11/24   Donovan Gooden MD   tamsulosin (Flomax) 0.4 mg 24 hr capsule Take 1 capsule (0.4 mg) by mouth once daily. 10/11/24   Donovan Gooden MD   cholecalciferol (Vitamin D3) 50 mcg (2,000 unit) capsule Take 1 capsule (50 mcg) by mouth once daily. (With food) 10/11/24 1/7/25  Donovan Gooden MD        PAT ROS     DASI Risk Score    No data to display       Caprini DVT Assessment    No data to display       Modified Frailty Index    No data to display       CHADS2 Stroke Risk  Current as of yesterday        N/A 3 to 100%: High Risk   2 to < 3%: Medium Risk   0 to < 2%: Low Risk     Last Change: N/A          This score determines the patient's risk of having a stroke if the patient has atrial fibrillation.        This score is not applicable to this patient. Components are not calculated.          Revised Cardiac Risk Index    No data to display       Apfel Simplified Score    No data to display       Risk Analysis Index Results This Encounter    No data found in the last 10 encounters.       Prodigy: High Risk  Total Score: 8              Prodigy Gender Score          ARISCAT Score for Postoperative Pulmonary Complications    No data to display       Pereyra Perioperative Risk for Myocardial Infarction or Cardiac Arrest (OPHELIA)    No data to display         Nurse Plan of Action:     RN screening call complete.  Reviewed allergies, medications and pharmacy, medical, surgical and social history with patient.  Chart updated.  Instructed patient not to take NSAIDs 1 week prior to surgery.

## 2025-01-13 ENCOUNTER — DOCUMENTATION (OUTPATIENT)
Dept: PREADMISSION TESTING | Facility: HOSPITAL | Age: 55
End: 2025-01-13

## 2025-01-13 ENCOUNTER — LAB (OUTPATIENT)
Dept: LAB | Facility: LAB | Age: 55
End: 2025-01-13
Payer: COMMERCIAL

## 2025-01-13 ENCOUNTER — PRE-ADMISSION TESTING (OUTPATIENT)
Dept: PREADMISSION TESTING | Facility: HOSPITAL | Age: 55
End: 2025-01-13
Payer: COMMERCIAL

## 2025-01-13 VITALS
TEMPERATURE: 97.8 F | RESPIRATION RATE: 18 BRPM | BODY MASS INDEX: 29.98 KG/M2 | HEART RATE: 50 BPM | WEIGHT: 209.44 LBS | DIASTOLIC BLOOD PRESSURE: 80 MMHG | SYSTOLIC BLOOD PRESSURE: 124 MMHG | HEIGHT: 70 IN | OXYGEN SATURATION: 97 %

## 2025-01-13 DIAGNOSIS — E78.2 MIXED HYPERLIPIDEMIA: ICD-10-CM

## 2025-01-13 DIAGNOSIS — N40.1 BPH WITH OBSTRUCTION/LOWER URINARY TRACT SYMPTOMS: ICD-10-CM

## 2025-01-13 DIAGNOSIS — N32.81 OAB (OVERACTIVE BLADDER): ICD-10-CM

## 2025-01-13 DIAGNOSIS — Z00.00 WELLNESS EXAMINATION: ICD-10-CM

## 2025-01-13 DIAGNOSIS — E55.9 VITAMIN D DEFICIENCY: ICD-10-CM

## 2025-01-13 DIAGNOSIS — I10 PRIMARY HYPERTENSION: ICD-10-CM

## 2025-01-13 DIAGNOSIS — N13.8 BPH WITH OBSTRUCTION/LOWER URINARY TRACT SYMPTOMS: ICD-10-CM

## 2025-01-13 LAB
25(OH)D3 SERPL-MCNC: 19 NG/ML (ref 30–100)
ALBUMIN SERPL BCP-MCNC: 4.5 G/DL (ref 3.4–5)
ALP SERPL-CCNC: 40 U/L (ref 33–120)
ALT SERPL W P-5'-P-CCNC: 40 U/L (ref 10–52)
ANION GAP SERPL CALC-SCNC: 11 MMOL/L (ref 10–20)
APPEARANCE UR: CLEAR
AST SERPL W P-5'-P-CCNC: 25 U/L (ref 9–39)
BASOPHILS # BLD AUTO: 0.05 X10*3/UL (ref 0–0.1)
BASOPHILS NFR BLD AUTO: 0.9 %
BILIRUB SERPL-MCNC: 0.5 MG/DL (ref 0–1.2)
BILIRUB UR STRIP.AUTO-MCNC: NEGATIVE MG/DL
BUN SERPL-MCNC: 17 MG/DL (ref 6–23)
CALCIUM SERPL-MCNC: 9.4 MG/DL (ref 8.6–10.3)
CHLORIDE SERPL-SCNC: 101 MMOL/L (ref 98–107)
CHOLEST SERPL-MCNC: 152 MG/DL (ref 0–199)
CHOLESTEROL/HDL RATIO: 3.2
CO2 SERPL-SCNC: 32 MMOL/L (ref 21–32)
COLOR UR: ABNORMAL
CREAT SERPL-MCNC: 0.75 MG/DL (ref 0.5–1.3)
CRP SERPL HS-MCNC: 2.7 MG/L
EGFRCR SERPLBLD CKD-EPI 2021: >90 ML/MIN/1.73M*2
EOSINOPHIL # BLD AUTO: 0.34 X10*3/UL (ref 0–0.7)
EOSINOPHIL NFR BLD AUTO: 6.3 %
ERYTHROCYTE [DISTWIDTH] IN BLOOD BY AUTOMATED COUNT: 12.6 % (ref 11.5–14.5)
EST. AVERAGE GLUCOSE BLD GHB EST-MCNC: 91 MG/DL
GLUCOSE SERPL-MCNC: 97 MG/DL (ref 74–99)
GLUCOSE UR STRIP.AUTO-MCNC: NORMAL MG/DL
HBA1C MFR BLD: 4.8 %
HCT VFR BLD AUTO: 46.1 % (ref 41–52)
HDLC SERPL-MCNC: 46.8 MG/DL
HGB BLD-MCNC: 15.9 G/DL (ref 13.5–17.5)
IMM GRANULOCYTES # BLD AUTO: 0.02 X10*3/UL (ref 0–0.7)
IMM GRANULOCYTES NFR BLD AUTO: 0.4 % (ref 0–0.9)
KETONES UR STRIP.AUTO-MCNC: NEGATIVE MG/DL
LDLC SERPL CALC-MCNC: 80 MG/DL
LEUKOCYTE ESTERASE UR QL STRIP.AUTO: NEGATIVE
LYMPHOCYTES # BLD AUTO: 1.62 X10*3/UL (ref 1.2–4.8)
LYMPHOCYTES NFR BLD AUTO: 29.9 %
MCH RBC QN AUTO: 31.4 PG (ref 26–34)
MCHC RBC AUTO-ENTMCNC: 34.5 G/DL (ref 32–36)
MCV RBC AUTO: 91 FL (ref 80–100)
MONOCYTES # BLD AUTO: 0.43 X10*3/UL (ref 0.1–1)
MONOCYTES NFR BLD AUTO: 7.9 %
MUCOUS THREADS #/AREA URNS AUTO: ABNORMAL /LPF
NEUTROPHILS # BLD AUTO: 2.95 X10*3/UL (ref 1.2–7.7)
NEUTROPHILS NFR BLD AUTO: 54.6 %
NITRITE UR QL STRIP.AUTO: NEGATIVE
NON HDL CHOLESTEROL: 105 MG/DL (ref 0–149)
NRBC BLD-RTO: 0 /100 WBCS (ref 0–0)
PH UR STRIP.AUTO: 5 [PH]
PLATELET # BLD AUTO: 169 X10*3/UL (ref 150–450)
POTASSIUM SERPL-SCNC: 3.5 MMOL/L (ref 3.5–5.3)
PROT SERPL-MCNC: 6.9 G/DL (ref 6.4–8.2)
PROT UR STRIP.AUTO-MCNC: NEGATIVE MG/DL
PSA SERPL-MCNC: 1.7 NG/ML
RBC # BLD AUTO: 5.07 X10*6/UL (ref 4.5–5.9)
RBC # UR STRIP.AUTO: ABNORMAL /UL
RBC #/AREA URNS AUTO: ABNORMAL /HPF
SODIUM SERPL-SCNC: 140 MMOL/L (ref 136–145)
SP GR UR STRIP.AUTO: 1.02
TRIGL SERPL-MCNC: 125 MG/DL (ref 0–149)
TSH SERPL-ACNC: 1.43 MIU/L (ref 0.44–3.98)
UROBILINOGEN UR STRIP.AUTO-MCNC: NORMAL MG/DL
VLDL: 25 MG/DL (ref 0–40)
WBC # BLD AUTO: 5.4 X10*3/UL (ref 4.4–11.3)
WBC #/AREA URNS AUTO: ABNORMAL /HPF

## 2025-01-13 PROCEDURE — 84153 ASSAY OF PSA TOTAL: CPT

## 2025-01-13 PROCEDURE — 86141 C-REACTIVE PROTEIN HS: CPT

## 2025-01-13 PROCEDURE — 99204 OFFICE O/P NEW MOD 45 MIN: CPT | Performed by: PHYSICIAN ASSISTANT

## 2025-01-13 PROCEDURE — 85025 COMPLETE CBC W/AUTO DIFF WBC: CPT

## 2025-01-13 PROCEDURE — 80061 LIPID PANEL: CPT

## 2025-01-13 PROCEDURE — 83036 HEMOGLOBIN GLYCOSYLATED A1C: CPT

## 2025-01-13 PROCEDURE — 84443 ASSAY THYROID STIM HORMONE: CPT

## 2025-01-13 PROCEDURE — 80053 COMPREHEN METABOLIC PANEL: CPT

## 2025-01-13 PROCEDURE — 81001 URINALYSIS AUTO W/SCOPE: CPT

## 2025-01-13 PROCEDURE — 82306 VITAMIN D 25 HYDROXY: CPT

## 2025-01-13 ASSESSMENT — ENCOUNTER SYMPTOMS
SKIN CHANGES: 0
ARTHRALGIAS: 0
FEVER: 0
EYE PAIN: 0
NECK STIFFNESS: 0
NAUSEA: 0
WHEEZING: 0
LIGHT-HEADEDNESS: 0
SINUS CONGESTION: 0
DIFFICULTY URINATING: 0
EYE DISCHARGE: 0
WEAKNESS: 0
MYALGIAS: 0
BRUISES/BLEEDS EASILY: 0
DYSPNEA WITH EXERTION: 0
COUGH: 0
VISUAL CHANGE: 0
ABDOMINAL PAIN: 0
DYSPNEA AT REST: 0
DYSURIA: 0
LIMITED RANGE OF MOTION: 0
NECK PAIN: 0
RHINORRHEA: 0
TROUBLE SWALLOWING: 0
CHILLS: 0
VOMITING: 0
CONFUSION: 0
NUMBNESS: 0
DIARRHEA: 0
UNEXPECTED WEIGHT CHANGE: 0
SHORTNESS OF BREATH: 0
ABDOMINAL DISTENTION: 0
DOUBLE VISION: 0
BLOOD IN STOOL: 0
HEMOPTYSIS: 0
WOUND: 0
EXCESSIVE BLEEDING: 0
CONSTIPATION: 0
PALPITATIONS: 0

## 2025-01-13 NOTE — PREPROCEDURE INSTRUCTIONS
Medication List            Accurate as of January 13, 2025  8:36 AM. Always use your most recent med list.                albuterol 90 mcg/actuation inhaler  Inhale 2 puffs every 6 hours if needed for wheezing.  Medication Adjustments for Surgery: Take/Use as prescribed     atenoloL-chlorthalidone 100-25 mg tablet  Commonly known as: Tenoretic  Take 1 tablet by mouth once daily.  Medication Adjustments for Surgery: Take on the morning of surgery     fluticasone 50 mcg/actuation nasal spray  Commonly known as: Flonase  Medication Adjustments for Surgery: Take/Use as prescribed     simvastatin 40 mg tablet  Commonly known as: Zocor  Take 1 tablet (40 mg) by mouth once daily.  Medication Adjustments for Surgery: Take on the morning of surgery     tamsulosin 0.4 mg 24 hr capsule  Commonly known as: Flomax  Take 1 capsule (0.4 mg) by mouth once daily.  Medication Adjustments for Surgery: Take/Use as prescribed                            **Concerning above medication instructions, if medication is normally taken at night, continue normal schedule.**  **DO NOT TAKE NIGHT PRIOR AND MORNING OF SURGERY**    CONTACT SURGEON'S OFFICE IF YOU DEVELOP:  * Fever = 100.4 F   * New respiratory symptoms (e.g. cough, shortness of breath, respiratory distress, sore throat)  * Recent loss of taste or smell  *Flu like symptoms such as headache, fatigue or gastrointestinal symptoms  * You develop any open sores, shingles, burning or painful urination   AND/OR:  * You no longer wish to have the surgery.  * Any other personal circumstances change that may lead to the need to cancel or defer this surgery.  *You were admitted to any hospital within one week of your planned procedure.    SMOKING:  *Quitting smoking can make a huge difference to your health and recovery from surgery.    *If you need help with quitting, call 5-712-QUIT-NOW.    THE DAY OF SURGERY:  *Do not eat any food after midnight the night before surgery.   *You must drink  13.5 ounces of clear liquids (i.e. water, black coffee (no milk or cream), tea, apple juice or electrolyte drinks (gatorade)) 2 hours before your arrival time.  *You may chew gum until 2 hours before your surgery    SURGICAL TIME  *You will be contacted between 2 p.m. and 6 p.m. the business day before your surgery with your arrival time.  *If you haven't received a call by 6pm, call [phone].  *Scheduled surgery times may change and you will be notified if this occurs-check your personal voicemail for any updates.    ON THE MORNING OF SURGERY:  *Wear comfortable, loose fitting clothing.   *Do not use moisturizers, creams, lotions or perfume.  *All jewelry and valuables should be left at home.  *Prosthetic devices such as contact lenses, hearing aids, dentures, eyelash extensions, hairpins and body piercing must be removed before surgery.    BRING WITH YOU:  *Photo ID and insurance card  *Current list of medicines and allergies  *Pacemaker/Defibrillator/Heart stent cards  *CPAP machine and mask  *Slings/splints/crutches  *Copy of your complete Advanced Directive/DHPOA-if applicable  *Neurostimulator implant remote    PARKING AND ARRIVAL:  *Check in at the Main Entrance desk and let them know you are here for surgery.  *You will be directed to the 2nd floor surgical waiting area.    AFTER OUTPATIENT SURGERY:  *A responsible adult MUST accompany you at the time of discharge and stay with you for 24 hours after your surgery.  *You may NOT drive yourself home after surgery.  *You may use a taxi or ride sharing service ([employer], [employer]) to return home ONLY if you are accompanied by a friend or family member.  *Instructions for resuming your medications will be provided by your surgeon.       Preoperative Deep Breathing Exercises  Why it is important to do deep breathing exercises before my surgery?  Deep breathing exercises strengthen your breathing muscles.  This helps you to recover after your surgery and decreases the  chance of breathing complications.  How are the deep breathing exercises done?  Sit straight with your back supported.  Breathe in deeply and slowly through your nose. Your lower rib cage should expand and your abdomen may move forward.  Hold that breath for 3 to 5 seconds.  Breathe out through pursed lips, slowly and completely.  Rest and repeat 10 times every hour while awake.  Rest longer if you become dizzy or lightheaded.

## 2025-01-13 NOTE — CPM/PAT H&P
[facility]       Name: Sekou Negron (Sekou Negron)  /Age: 1970/54 y.o.       Date of Consult: 25    Referring Provider: Dr. James    Surgery, Date, and Length: Holmium Laser Enucleation Transurethral Prostate , 25, 120MIN    Sekou Negron is a 54 y.o. year-old male who presents to the [facility] for perioperative risk assessment prior to surgery.    Patient presents with a primary diagnosis of BPH. Pt refers to nocturia x 2.  He denies frequency during the day time.  No dysuria, f/c/n/v or gross hematuria.  He is s/p cystoscopy in the office 24.  Pt wishes to proceed as planned.     This note was created in part upon personal review of patient's medical records.      Patient is scheduled to have Holmium Laser Enucleation Transurethral Prostate     Pt has only had light sedation in the past. No general anesthesia.  Pt denies any past history of anesthetic complications such as PONV, awareness, prolonged sedation, dental damage, aspiration, cardiac arrest, difficult intubation, difficult I.V. access or unexpected hospital admissions.  NO malignant hyperthermia and or pseudocholinesterase deficiency.  No history of blood transfusions     The patient is not a Yazidi and will accept blood and blood products if medically indicated.   Type and screen NOT sent.     Past Medical History:   Diagnosis Date    Asthma     albuterol once/month    Asymmetric septal hypertrophy     BPH (benign prostatic hyperplasia)     Celiac disease (HHS-HCC)     childhood    Cutaneous cyst     recurrent - suprapubic    Diverticulitis     1 episode 3 yrs ago    Hyperlipidemia     Hypertension     Hypertrophic cardiomyopathy (Multi)     ECHO 24: 1. Left ventricular ejection fraction is normal, by visual estimate at 60-65%.  2. Abnormally shaped sigmoid septum measuring 2.3 cm that could indicate hypertrophic cardiomyopathy. No evidence of MANUEL or cavity/outlet obstruction. There is acceleration  across the LVOT with normal opening of the aortic valve. cMRI scheduled after surgery - pt removed to move up,    Migraine     Mild aortic regurgitation     OAB (overactive bladder)     Pre-operative clearance     cardiac clearance requested, pt being worked up for hypertrophic cardiomyopathy, cMRI sched 1/23 - refused to move up.  States cards knows he's having surgery and said fine, cards clearance requested    Seasonal allergic rhinitis     Tubular adenoma     Vitamin D deficiency        Past Surgical History:   Procedure Laterality Date    COLONOSCOPY      UPPER GASTROINTESTINAL ENDOSCOPY         Patient  has no history on file for sexual activity.    Family History   Adopted: Yes     Social History     Socioeconomic History    Marital status:      Spouse name: Not on file    Number of children: Not on file    Years of education: Not on file    Highest education level: Not on file   Occupational History    Not on file   Tobacco Use    Smoking status: Never    Smokeless tobacco: Never   Vaping Use    Vaping status: Never Used   Substance and Sexual Activity    Alcohol use: Yes     Alcohol/week: 7.0 standard drinks of alcohol     Types: 7 Standard drinks or equivalent per week    Drug use: Yes     Types: Marijuana     Comment: few times/year    Sexual activity: Not on file   Other Topics Concern    Not on file   Social History Narrative    Not on file     Social Drivers of Health     Financial Resource Strain: Low Risk  (12/13/2023)    Received from [facility]    Overall Financial Resource Strain (CARDIA)     Difficulty of Paying Living Expenses: Not hard at all   Food Insecurity: No Food Insecurity (12/13/2023)    Received from [facility]    Hunger Vital Sign     Worried About Running Out of Food in the Last Year: Never true     Ran Out of Food in the Last Year: Never true   Transportation Needs: No Transportation Needs (12/13/2023)    Received [facility]    PRAPARE - Transportation     Lack of Transportation (Medical): No     Lack of Transportation (Non-Medical): No   Physical Activity: Insufficiently Active (12/13/2023)    Received from [facility]    Exercise Vital Sign     Days of Exercise per Week: 3 days     Minutes of Exercise per Session: 20 min   Stress: Stress Concern Present (12/13/2023)    Received from [facility]    [employer] [place] [employer] - Occupational Stress Questionnaire     Feeling of Stress : To some extent   Social Connections: Socially Integrated (12/13/2023)    Received from [facility]    Social Connection and Isolation Panel [NHANES]     Frequency of Communication with Friends and Family: Twice a week     Frequency of Social Gatherings with Friends and Family: Once a week     Attends Voodoo Services: 1 to 4 times per year     Active Member of Clubs or Organizations: Yes     Attends Club or Organization Meetings: 1 to 4 times per year     Marital Status:    Intimate Partner Violence: Not on file   Housing Stability: Low Risk  (12/13/2023)    Received from [facility]    Housing Stability Vital Sign     Unable to Pay for Housing in the Last Year: No     Number of Places Lived in the Last Year: 1     Unstable Housing in the Last Year: No        No Known Allergies    Current Outpatient Medications   Medication Instructions    albuterol 90 mcg/actuation inhaler 2 puffs, inhalation, Every 6 hours PRN    atenoloL-chlorthalidone (Tenoretic) 100-25 mg tablet 1 tablet, oral, Daily    fluticasone (Flonase) 50 mcg/actuation nasal spray Administer 2 sprays into each nostril once daily. As needed for allergies    simvastatin (ZOCOR) 40 mg, oral, Daily RT    tamsulosin (FLOMAX) 0.4 mg, oral, Daily RT            PAT ROS:   Constitutional:    no fever   no chills   no unexpected weight change  Neuro/Psych:    no  numbness   no weakness   no light-headedness   no confusion  Eyes:    no discharge   no pain   no vision loss   no diplopia   no visual disturbance  Ears:    no ear pain   no hearing loss   no tinnitus  Nose:    no nasal discharge   no sinus congestion   no epistaxis  Mouth:    no dental issues   no mouth pain   no oral bleeding   no mouth lesions  Throat:    no throat pain   no dysphagia  Neck:    no neck pain   no neck stiffness  Cardio:    Functional 4 Mets. Patient denies SOB walking up 2 flights of stairs   Walking; cooking, cleaning, grocery shopping, yardwork - mowing / shoveling snow   no chest pain   no palpitations   no peripheral edema   no dyspnea   no FRANCOIS  Respiratory:    no cough   no wheezing   no hemoptysis   no shortness of breath  Endocrine:    no cold intolerance   no heat intolerance  GI:    no abdominal distention   no abdominal pain   no constipation   no diarrhea   no nausea   no vomiting   no blood in stool  :    + urgency and nocturia x3   no difficulty urinating   no dysuria   no oliguria  Musculoskeletal:    no arthralgias   no myalgias   no decreased ROM  Hematologic:    does not bruise/bleed easily   no excessive bleeding   no history of blood transfusion   no blood clots  Skin:   no skin changes   no sores/wound   no rash      Physical Exam  Constitutional:       General: He is not in acute distress.     Appearance: Normal appearance. He is not ill-appearing, toxic-appearing or diaphoretic.   HENT:      Head: Normocephalic and atraumatic.      Nose: Nose normal. No congestion or rhinorrhea.      Mouth/Throat:      Mouth: Mucous membranes are moist.      Pharynx: No posterior oropharyngeal erythema.   Eyes:      Extraocular Movements: Extraocular movements intact.      Conjunctiva/sclera: Conjunctivae normal.   Cardiovascular:      Rate and Rhythm: Normal rate and regular rhythm.      Pulses: Normal pulses.      Heart sounds: Murmur (2/6 diastolic RUSB) heard.      No friction rub. No  "gallop.   Pulmonary:      Effort: Pulmonary effort is normal. No respiratory distress.      Breath sounds: Normal breath sounds. No stridor. No wheezing, rhonchi or rales.   Abdominal:      General: Bowel sounds are normal. There is no distension.      Palpations: Abdomen is soft. There is no mass.      Tenderness: There is no abdominal tenderness. There is no guarding or rebound.      Hernia: No hernia is present.   Musculoskeletal:         General: No swelling, tenderness, deformity or signs of injury. Normal range of motion.      Cervical back: Normal range of motion and neck supple. No rigidity or tenderness.   Skin:     General: Skin is warm and dry.      Coloration: Skin is not jaundiced or pale.      Findings: No bruising, erythema, lesion or rash.   Neurological:      General: No focal deficit present.      Mental Status: He is alert and oriented to person, place, and time.      Cranial Nerves: No cranial nerve deficit.      Sensory: No sensory deficit.      Motor: No weakness.      Coordination: Coordination normal.   Psychiatric:         Mood and Affect: Mood normal.         Behavior: Behavior normal.          PAT AIRWAY:   Airway:     Mallampati::  II    Neck ROM::  Full   No broken teeth, no dentures and no missing teeth            Visit Vitals  /80   Pulse 50   Temp 36.6 °C (97.8 °F)   Resp 18   Ht 1.765 m (5' 9.5\")   Wt 95 kg (209 lb 7 oz)   SpO2 97%   BMI 30.49 kg/m²   Smoking Status Never   BSA 2.16 m²      LABS:  Lab Results   Component Value Date    WBC 5.4 01/13/2025    HGB 15.9 01/13/2025    HCT 46.1 01/13/2025    MCV 91 01/13/2025     01/13/2025      Lab Results   Component Value Date    GLUCOSE 97 01/13/2025    CALCIUM 9.4 01/13/2025     01/13/2025    K 3.5 01/13/2025    CO2 32 01/13/2025     01/13/2025    BUN 17 01/13/2025    CREATININE 0.75 01/13/2025       Contains abnormal data Urinalysis with Reflex Culture and Microscopic  Order: [id number] - Part of Panel Order " [id number]   Status: Final result       Visible to patient: Yes (not seen)       Dx: BPH with obstruction/lower urinary tr...    0 Result Notes       Component  Ref Range & Units 09:01 3 mo ago   Color, Urine  Light-Yellow, Yellow, Dark-Yellow Light-Yellow Yellow   Appearance, Urine  Clear Clear Turbid Normal (N)   Specific Gravity, Urine  1.005 - 1.035 1.025 1.025   pH, Urine  5.0, 5.5, 6.0, 6.5, 7.0, 7.5, 8.0 5.0 7.5   Protein, Urine  NEGATIVE, 10 (TRACE), 20 (TRACE) mg/dL NEGATIVE 10 (TRACE)   Glucose, Urine  Normal mg/dL Normal Normal   Blood, Urine  NEGATIVE 0.06 (1+) Abnormal  NEGATIVE   Ketones, Urine  NEGATIVE mg/dL NEGATIVE NEGATIVE   Bilirubin, Urine  NEGATIVE NEGATIVE NEGATIVE   Urobilinogen, Urine  Normal mg/dL Normal Normal   Nitrite, Urine  NEGATIVE NEGATIVE NEGATIVE   Leukocyte Esterase, Urine  NEGATIVE NEGATIVE NEGATIVE   Resulting Agency [facility]             Specimen Collected: 01/13/25 09:01     Urinalysis Microscopic  Order: [id number] - Reflex for Order [id number]   Status: Final result       Visible to patient: Yes (not seen)       Dx: BPH with obstruction/lower urinary tr...    0 Result Notes       Component  Ref Range & Units 09:01 3 mo ago   WBC, Urine  1-5, NONE /HPF 1-5 1-5   RBC, Urine  NONE, 1-2, 3-5 /HPF 11-20 Abnormal  6-10 Abnormal    Mucus, Urine  Reference range not established. /LPF FEW 1+   Resulting Agency [facility]             Specimen Collected: 01/13/25 09:01       EKG 1/7/25      ECHO 11/12/24    CONCLUSIONS:   1. Left ventricular ejection fraction is normal, by visual estimate at 60-65%.   2. Abnormally shaped sigmoid septum measuring 2.3 cm that could indicate hypertrophic cardiomyopathy. No evidence of MANUEL or cavity/outlet obstruction. There is acceleration across the LVOT with normal opening of the aortic valve.   3. There is normal right ventricular global systolic function.   4. Right ventricular systolic pressure is within normal limits.   5. Mild aortic valve  regurgitation.      Assessment and Plan:       54 y.o.  male  scheduled for Holmium Laser Enucleation Transurethral Prostate  on 1/14/25 with Dr. James for  BPH.   P[facility] to [facility] for perioperative risk stratification and optimization    Cardiovascular:  Patient has no active cardiac symptoms.   Patient denies any chest pain, tightness, heaviness, pressure, radiating pain, palpitations, irregular heartbeats, lightheadedness, cough, congestion, shortness of breath, FRANCOIS, PND, near syncope, weight loss or gain.    METS: 4+  RCRI: 0 points, 3.9%  risk for postoperative MACE     HTN - cont atenolol/chlorthalidone on dos   Encouraged lifestyle modifications, low-sodium diet, and increase activity as tolerated.  Monitor BP and follow up with managing physician for readings sustaining >140/90.    HLD - cont statin on dos    Septal hypertrophy - cardiac MRI was ordered (1/7/25) but patient refused.  Cardiac risk assessment received 1/8/25 (after order placed) which states:              Pulmonary:  No pulmonary diagnosis or significant findings on chart review or clinical presentation.  No further preoperative testing is indicated at this time.  duration of surgery > 2 hours  Stop Bang score is 3 placing patient at intermediate risk for ANA  ARISCAT: <26 points, 1.6% risk of in-hospital postoperative pulmonary complication  PRODIGY: Moderate risk for opioid induced respiratory depression    Asthma - continue inhalers as prescribed    **Pt provided with deep breathing exercises during PAT visit today**    Hematology:  Patient instructed to ambulate as soon as possible postoperatively to decrease thromboembolic risk.   Initiate mechanical DVT prophylaxis as soon as possible and initiate chemical prophylaxis when deemed safe from a bleeding standpoint post surgery.     LABS: CBC, BMP, UA flex ordered. Lab results reviewed and unremarkable    Caprini: 4    Risk assessment complete.  Patient is scheduled for a intermediate  surgical risk procedure.        Preoperative medication instructions were provided and reviewed with the patient.  Any additional testing or evaluation was explained to the patient.  Nothing by mouth instructions were discussed and patient's questions were answered prior to conclusion to this encounter.  Patient verbalized understanding of preoperative instructions given in preadmission testing; discharge instructions available in EMR.    This note was dictated by a speech recognition.  Minor errors may have been detected in a speech recognition.

## 2025-01-13 NOTE — H&P (VIEW-ONLY)
[facility] Evaluation       Name: Sekou Negron (Sekou Negron)  /Age: 1970/54 y.o.       Date of Consult: 25    Referring Provider: Dr. James    Surgery, Date, and Length: Holmium Laser Enucleation Transurethral Prostate , 25, 120MIN    Sekou Negron is a 54 y.o. year-old male who presents to the [facility] for perioperative risk assessment prior to surgery.    Patient presents with a primary diagnosis of BPH. Pt refers to nocturia x 2.  He denies frequency during the day time.  No dysuria, f/c/n/v or gross hematuria.  He is s/p cystoscopy in the office 24.  Pt wishes to proceed as planned.     This note was created in part upon personal review of patient's medical records.      Patient is scheduled to have Holmium Laser Enucleation Transurethral Prostate     Pt has only had light sedation in the past. No general anesthesia.  Pt denies any past history of anesthetic complications such as PONV, awareness, prolonged sedation, dental damage, aspiration, cardiac arrest, difficult intubation, difficult I.V. access or unexpected hospital admissions.  NO malignant hyperthermia and or pseudocholinesterase deficiency.  No history of blood transfusions     The patient is not a Buddhist and will accept blood and blood products if medically indicated.   Type and screen NOT sent.     Past Medical History:   Diagnosis Date    Asthma     albuterol once/month    Asymmetric septal hypertrophy     BPH (benign prostatic hyperplasia)     Celiac disease ([facility]-HCC)     childhood    Cutaneous cyst     recurrent - suprapubic    Diverticulitis     1 episode 3 yrs ago    Hyperlipidemia     Hypertension     Hypertrophic cardiomyopathy (Multi)     ECHO 24: 1. Left ventricular ejection fraction is normal, by visual estimate at 60-65%.  2. Abnormally shaped sigmoid septum measuring 2.3 cm that could indicate hypertrophic cardiomyopathy. No evidence of MANUEL or cavity/outlet obstruction. There is acceleration  across the LVOT with normal opening of the aortic valve. cMRI scheduled after surgery - pt removed to move up,    Migraine     Mild aortic regurgitation     OAB (overactive bladder)     Pre-operative clearance     cardiac clearance requested, pt being worked up for hypertrophic cardiomyopathy, cMRI sched 1/23 - refused to move up.  States cards knows he's having surgery and said fine, cards clearance requested    Seasonal allergic rhinitis     Tubular adenoma     Vitamin D deficiency        Past Surgical History:   Procedure Laterality Date    COLONOSCOPY      UPPER GASTROINTESTINAL ENDOSCOPY         Patient  has no history on file for sexual activity.    Family History   Adopted: Yes     Social History     Socioeconomic History    Marital status:      Spouse name: Not on file    Number of children: Not on file    Years of education: Not on file    Highest education level: Not on file   Occupational History    Not on file   Tobacco Use    Smoking status: Never    Smokeless tobacco: Never   Vaping Use    Vaping status: Never Used   Substance and Sexual Activity    Alcohol use: Yes     Alcohol/week: 7.0 standard drinks of alcohol     Types: 7 Standard drinks or equivalent per week    Drug use: Yes     Types: Marijuana     Comment: few times/year    Sexual activity: Not on file   Other Topics Concern    Not on file   Social History Narrative    Not on file     Social Drivers of Health     Financial Resource Strain: Low Risk  (12/13/2023)    Received from [facility] [place]    Overall Financial Resource Strain (CARDIA)     Difficulty of Paying Living Expenses: Not hard at all   Food Insecurity: No Food Insecurity (12/13/2023)    Received from [facility] [place]    Hunger Vital Sign     Worried About Running Out of Food in the Last Year: Never true     Ran Out of Food in the Last Year: Never true   Transportation Needs: No Transportation Needs (12/13/2023)    Received from [facility]    PRAPARE - Transportation     Lack of Transportation (Medical): No     Lack of Transportation (Non-Medical): No   Physical Activity: Insufficiently Active (12/13/2023)    Received from [facility]    Exercise Vital Sign     Days of Exercise per Week: 3 days     Minutes of Exercise per Session: 20 min   Stress: Stress Concern Present (12/13/2023)    Received from [facility]    [place] [employer] - Occupational Stress Questionnaire     Feeling of Stress : To some extent   Social Connections: Socially Integrated (12/13/2023)    Received from [facility]    Social Connection and Isolation Panel [NHANES]     Frequency of Communication with Friends and Family: Twice a week     Frequency of Social Gatherings with Friends and Family: Once a week     Attends Christianity Services: 1 to 4 times per year     Active Member of Clubs or Organizations: Yes     Attends Club or Organization Meetings: 1 to 4 times per year     Marital Status:    Intimate Partner Violence: Not on file   Housing Stability: Low Risk  (12/13/2023)    Received from [facility]    Housing Stability Vital Sign     Unable to Pay for Housing in the Last Year: No     Number of Places Lived in the Last Year: 1     Unstable Housing in the Last Year: No        No Known Allergies    Current Outpatient Medications   Medication Instructions    albuterol 90 mcg/actuation inhaler 2 puffs, inhalation, Every 6 hours PRN    atenoloL-chlorthalidone (Tenoretic) 100-25 mg tablet 1 tablet, oral, Daily    fluticasone (Flonase) 50 mcg/actuation nasal spray Administer 2 sprays into each nostril once daily. As needed for allergies    simvastatin (ZOCOR) 40 mg, oral, Daily RT    tamsulosin (FLOMAX) 0.4 mg, oral, Daily RT            PAT ROS:   Constitutional:    no fever   no chills   no unexpected weight change  Neuro/Psych:    no  numbness   no weakness   no light-headedness   no confusion  Eyes:    no discharge   no pain   no vision loss   no diplopia   no visual disturbance  Ears:    no ear pain   no hearing loss   no tinnitus  Nose:    no nasal discharge   no sinus congestion   no epistaxis  Mouth:    no dental issues   no mouth pain   no oral bleeding   no mouth lesions  Throat:    no throat pain   no dysphagia  Neck:    no neck pain   no neck stiffness  Cardio:    Functional 4 Mets. Patient denies SOB walking up 2 flights of stairs   Walking; cooking, cleaning, grocery shopping, yardwork - mowing / shoveling snow   no chest pain   no palpitations   no peripheral edema   no dyspnea   no FRANCOIS  Respiratory:    no cough   no wheezing   no hemoptysis   no shortness of breath  Endocrine:    no cold intolerance   no heat intolerance  GI:    no abdominal distention   no abdominal pain   no constipation   no diarrhea   no nausea   no vomiting   no blood in stool  :    + urgency and nocturia x3   no difficulty urinating   no dysuria   no oliguria  Musculoskeletal:    no arthralgias   no myalgias   no decreased ROM  Hematologic:    does not bruise/bleed easily   no excessive bleeding   no history of blood transfusion   no blood clots  Skin:   no skin changes   no sores/wound   no rash      Physical Exam  Constitutional:       General: He is not in acute distress.     Appearance: Normal appearance. He is not ill-appearing, toxic-appearing or diaphoretic.   HENT:      Head: Normocephalic and atraumatic.      Nose: Nose normal. No congestion or rhinorrhea.      Mouth/Throat:      Mouth: Mucous membranes are moist.      Pharynx: No posterior oropharyngeal erythema.   Eyes:      Extraocular Movements: Extraocular movements intact.      Conjunctiva/sclera: Conjunctivae normal.   Cardiovascular:      Rate and Rhythm: Normal rate and regular rhythm.      Pulses: Normal pulses.      Heart sounds: Murmur (2/6 diastolic RUSB) heard.      No friction rub. No  "gallop.   Pulmonary:      Effort: Pulmonary effort is normal. No respiratory distress.      Breath sounds: Normal breath sounds. No stridor. No wheezing, rhonchi or rales.   Abdominal:      General: Bowel sounds are normal. There is no distension.      Palpations: Abdomen is soft. There is no mass.      Tenderness: There is no abdominal tenderness. There is no guarding or rebound.      Hernia: No hernia is present.   Musculoskeletal:         General: No swelling, tenderness, deformity or signs of injury. Normal range of motion.      Cervical back: Normal range of motion and neck supple. No rigidity or tenderness.   Skin:     General: Skin is warm and dry.      Coloration: Skin is not jaundiced or pale.      Findings: No bruising, erythema, lesion or rash.   Neurological:      General: No focal deficit present.      Mental Status: He is alert and oriented to person, place, and time.      Cranial Nerves: No cranial nerve deficit.      Sensory: No sensory deficit.      Motor: No weakness.      Coordination: Coordination normal.   Psychiatric:         Mood and Affect: Mood normal.         Behavior: Behavior normal.          PAT AIRWAY:   Airway:     Mallampati::  II    Neck ROM::  Full   No broken teeth, no dentures and no missing teeth            Visit Vitals  /80   Pulse 50   Temp 36.6 °C (97.8 °F)   Resp 18   Ht 1.765 m (5' 9.5\")   Wt 95 kg (209 lb 7 oz)   SpO2 97%   BMI 30.49 kg/m²   Smoking Status Never   BSA 2.16 m²      LABS:  Lab Results   Component Value Date    WBC 5.4 01/13/2025    HGB 15.9 01/13/2025    HCT 46.1 01/13/2025    MCV 91 01/13/2025     01/13/2025      Lab Results   Component Value Date    GLUCOSE 97 01/13/2025    CALCIUM 9.4 01/13/2025     01/13/2025    K 3.5 01/13/2025    CO2 32 01/13/2025     01/13/2025    BUN 17 01/13/2025    CREATININE 0.75 01/13/2025       Contains abnormal data Urinalysis with Reflex Culture and Microscopic  Order: [id number] - Part of Panel Order " [id number]   Status: Final result       Visible to patient: Yes (not seen)       Dx: BPH with obstruction/lower urinary tr...    0 Result Notes       Component  Ref Range & Units 09:01 3 mo ago   Color, Urine  Light-Yellow, Yellow, Dark-Yellow Light-Yellow Yellow   Appearance, Urine  Clear Clear Turbid Normal (N)   Specific Gravity, Urine  1.005 - 1.035 1.025 1.025   pH, Urine  5.0, 5.5, 6.0, 6.5, 7.0, 7.5, 8.0 5.0 7.5   Protein, Urine  NEGATIVE, 10 (TRACE), 20 (TRACE) mg/dL NEGATIVE 10 (TRACE)   Glucose, Urine  Normal mg/dL Normal Normal   Blood, Urine  NEGATIVE 0.06 (1+) Abnormal  NEGATIVE   Ketones, Urine  NEGATIVE mg/dL NEGATIVE NEGATIVE   Bilirubin, Urine  NEGATIVE NEGATIVE NEGATIVE   Urobilinogen, Urine  Normal mg/dL Normal Normal   Nitrite, Urine  NEGATIVE NEGATIVE NEGATIVE   Leukocyte Esterase, Urine  NEGATIVE NEGATIVE NEGATIVE   Resulting Agency [facility]             Specimen Collected: 01/13/25 09:01     Urinalysis Microscopic  Order: [id number] - Reflex for Order [id number]   Status: Final result       Visible to patient: Yes (not seen)       Dx: BPH with obstruction/lower urinary tr...    0 Result Notes       Component  Ref Range & Units 09:01 3 mo ago   WBC, Urine  1-5, NONE /HPF 1-5 1-5   RBC, Urine  NONE, 1-2, 3-5 /HPF 11-20 Abnormal  6-10 Abnormal    Mucus, Urine  Reference range not established. /LPF FEW 1+   Resulting Agency [facility]             Specimen Collected: 01/13/25 09:01       EKG 1/7/25      ECHO 11/12/24    CONCLUSIONS:   1. Left ventricular ejection fraction is normal, by visual estimate at 60-65%.   2. Abnormally shaped sigmoid septum measuring 2.3 cm that could indicate hypertrophic cardiomyopathy. No evidence of MANUEL or cavity/outlet obstruction. There is acceleration across the LVOT with normal opening of the aortic valve.   3. There is normal right ventricular global systolic function.   4. Right ventricular systolic pressure is within normal limits.   5. Mild aortic valve  regurgitation.      Assessment and Plan:       54 y.o.  male  scheduled for Holmium Laser Enucleation Transurethral Prostate  on 1/14/25 with Dr. James for  BPH.   Presents to [facility] today for perioperative risk stratification and optimization    Cardiovascular:  Patient has no active cardiac symptoms.   Patient denies any chest pain, tightness, heaviness, pressure, radiating pain, palpitations, irregular heartbeats, lightheadedness, cough, congestion, shortness of breath, FRANCOIS, PND, near syncope, weight loss or gain.    METS: 4+  RCRI: 0 points, 3.9%  risk for postoperative MACE     HTN - cont atenolol/chlorthalidone on dos   Encouraged lifestyle modifications, low-sodium diet, and increase activity as tolerated.  Monitor BP and follow up with managing physician for readings sustaining >140/90.    HLD - cont statin on dos    Septal hypertrophy - cardiac MRI was ordered (1/7/25) but patient refused.  Cardiac risk assessment received 1/8/25 (after order placed) which states:              Pulmonary:  No pulmonary diagnosis or significant findings on chart review or clinical presentation.  No further preoperative testing is indicated at this time.  duration of surgery > 2 hours  Stop Bang score is 3 placing patient at intermediate risk for ANA  ARISCAT: <26 points, 1.6% risk of in-hospital postoperative pulmonary complication  PRODIGY: Moderate risk for opioid induced respiratory depression    Asthma - continue inhalers as prescribed    **Pt provided with deep breathing exercises during PAT visit today**    Hematology:  Patient instructed to ambulate as soon as possible postoperatively to decrease thromboembolic risk.   Initiate mechanical DVT prophylaxis as soon as possible and initiate chemical prophylaxis when deemed safe from a bleeding standpoint post surgery.     LABS: CBC, BMP, UA flex ordered. Lab results reviewed and unremarkable    Caprini: 4    Risk assessment complete.  Patient is scheduled for a intermediate  surgical risk procedure.        Preoperative medication instructions were provided and reviewed with the patient.  Any additional testing or evaluation was explained to the patient.  Nothing by mouth instructions were discussed and patient's questions were answered prior to conclusion to this encounter.  Patient verbalized understanding of preoperative instructions given in preadmission testing; discharge instructions available in EMR.    This note was dictated by a speech recognition.  Minor errors may have been detected in a speech recognition.

## 2025-01-13 NOTE — DISCHARGE INSTRUCTIONS
DEPARTMENT OF UROLOGY  DISCHARGE INSTRUCTIONS [facility]  Outpatient Surgery    C O N F I D E N T I A L   I N F O R M A T I O N    Sekou Negron        Call [phone] during regular daytime business hours (8:00 am - 5:00 pm) and after 5:00 pm and ask for the Urology resident with any questions or concerns.      If it is a life-threatening situation, proceed to the nearest emergency department.        Follow-up appointment:  1/16/25 (catheter removal); 1/30/25 (check voiding)     Thank you for the opportunity to care for you today.  Your health and healing are very important to us.  We hope we made you feel as comfortable as possible and are committed to your recovery and continued well-being.      The following is a brief overview of your transurethral prostate resection today. Some of the information contained on this summary may be confidential.  This information should be kept in your records and should be shared with your regular doctor.    Physicians:   Dr. James      Procedure performed: Prostate Resection  Pending results:   pathology of tissue taken from your prostate    What to Expect During your Recovery and Home Care  Anesthesia Side Effects   You received general anesthesia today.  You may feel sleepy, tired, or have a sore throat.   You may also feel drowsiness, dizziness, or inability to think clearly.  For your safety, do not drive, drink alcoholic beverages, take any unprescribed medication or make any important decisions for 24 hours.  A responsible adult should be with you for 24 hours.        Activity and Recovery    No heavy lifting over ten pounds. Limit activity while urinary catheter is in place. Avoid activities that would cause pulling or tugging on your catheter.    Pain Control  Unfortunately, you may experience pain after your procedure.  Adequate management can include alternative measures to help ease your pain and can include over the counter Tylenol or ibuprofen can be taken as  prescribed as needed. Do not take more than 4,000 mg of Tylenol in a 24-hour period.      You may also experience bladder spasms due to the catheter. Pyridium may be prescribed to help alleviate this problem.    If you tend to be constipated, please take the prescribed stool softeners and stay well-hydrated to keep the stools soft and avoid straining, which may increase the blood in your urine.    Nausea/Vomiting   Clear liquids are best tolerated at first. Start slow, advance your diet as tolerated to normal foods. Avoid spicy, greasy, heavy foods at first. Also, you may feel nauseous or like you need to vomit if you take any type of medication on an empty stomach.  Call your physician if you are unable to eat or drink and have persistent vomiting.    Signs of Bleeding   You are going to have some blood in your urine. Your urine will be light pink to yellow. You always want to look at the urine in the tubing of your catheter and not in the large urine collection bag to check for bleeding. If urine becomes thick dark eduarda red, has large clots or stops draining, please notify your physician.    Treatment/wound care:   Keep area(s) clean and dry. Clean around the tip or your penis were the catheter goes in daily with mild soap and water.  It is okay to shower 24 hours after time of surgery.    Do not submerge your catheter in standing water until seen for follow up appointment (no tub bathing, swimming, or hot tubs).      Signs of Infection  Signs of infection can include fever, drainage(green/yellow), chills, burning sensation with passing of urine, catheter leakage, or severe abdominal pain.  If you see any of these occur, please contact your doctor's office at [phone].  Any fever higher than 100.4, especially if associated with an ill feeling, abdominal pain, chills, or nausea should be reported to your surgeon.      Assist in bowel movements/urination  Increase fiber in diet  Increase water (6 to 8  glasses)  Increase walking     Additional Instructions: CATHETER CARE  Always keep the catheters tubing and drainage bag below the level of your bladder.  Avoid loops and kinks in the catheter tubing.  NOTIFY your physician if catheter falls out or catheter seems clogged and urine is not draining.   Do not wear the small leg bag to bed you should be provided with a larger overnight bag that you should wear to bed and can hang over the side of the bed.  We recommend wearing the large bag in the shower, as this is easy to dry, and you do not get your leg straps wet from your leg bag.   Your catheter should be secured to your upper thigh, do not allow it to hang or dangle.  Your catheter will be removed at your post-operative appointment.

## 2025-01-13 NOTE — CPM/PAT NURSE NOTE
CPM/PAT Nurse Note      Name: Sekou Negron (Sekou Negron)  /Age: 1970/54 y.o.       Past Medical History:   Diagnosis Date    Asthma     albuterol once/month    Asymmetric septal hypertrophy     BPH (benign prostatic hyperplasia)     Celiac disease ([facility]-HCC)     childhood    Cutaneous cyst     recurrent - suprapubic    Diverticulitis     1 episode 3 yrs ago    Hyperlipidemia     Hypertension     Hypertrophic cardiomyopathy (Multi)     ECHO 24: 1. Left ventricular ejection fraction is normal, by visual estimate at 60-65%.  2. Abnormally shaped sigmoid septum measuring 2.3 cm that could indicate hypertrophic cardiomyopathy. No evidence of MANUEL or cavity/outlet obstruction. There is acceleration across the LVOT with normal opening of the aortic valve. cMRI scheduled after surgery - pt removed to move up,    Migraine     Mild aortic regurgitation     OAB (overactive bladder)     Pre-operative clearance     cardiac clearance requested, pt being worked up for hypertrophic cardiomyopathy, cMRI sched  - refused to move up.  States cards knows he's having surgery and said fine, cards clearance requested    Seasonal allergic rhinitis     Tubular adenoma     Vitamin D deficiency        Past Surgical History:   Procedure Laterality Date    COLONOSCOPY      UPPER GASTROINTESTINAL ENDOSCOPY         Patient  has no history on file for sexual activity.    Family History   Adopted: Yes       No Known Allergies    Prior to Admission medications    Medication Sig Start Date End Date Taking? Authorizing Provider   albuterol 90 mcg/actuation inhaler Inhale 2 puffs every 6 hours if needed for wheezing. 24   Donovan Gooden MD   atenoloL-chlorthalidone (Tenoretic) 100-25 mg tablet Take 1 tablet by mouth once daily. 10/11/24   Donovan Gooden MD   fluticasone (Flonase) 50 mcg/actuation nasal spray Administer 2 sprays into each nostril once daily. As needed for allergies 22   Historical Provider, MD    simvastatin (Zocor) 40 mg tablet Take 1 tablet (40 mg) by mouth once daily. 10/11/24   Donovan Gooden MD   tamsulosin (Flomax) 0.4 mg 24 hr capsule Take 1 capsule (0.4 mg) by mouth once daily. 10/11/24   Donovan Gooden MD   cholecalciferol (Vitamin D3) 50 mcg (2,000 unit) capsule Take 1 capsule (50 mcg) by mouth once daily. (With food) 10/11/24 1/7/25  Donovan Gooden MD        PAT ROS     DASI Risk Score    No data to display       Caprini DVT Assessment    No data to display       Modified Frailty Index    No data to display       CHADS2 Stroke Risk  Current as of 5 hours ago        N/A 3 to 100%: High Risk   2 to < 3%: Medium Risk   0 to < 2%: Low Risk     Last Change: N/A          This score determines the patient's risk of having a stroke if the patient has atrial fibrillation.        This score is not applicable to this patient. Components are not calculated.          Revised Cardiac Risk Index    No data to display       Apfel Simplified Score    No data to display       Risk Analysis Index Results This Encounter    No data found in the last 10 encounters.       Prodigy: High Risk  Total Score: 8              Prodigy Gender Score          ARISCAT Score for Postoperative Pulmonary Complications      Flowsheet Row Pre-Admission Testing from 1/13/2025 in [facility] with Penelope Guillen PA-C   Age Calculated Score 3 filed at 01/13/2025 0835   Preoperative SpO2 0 filed at 01/13/2025 0835   Respiratory infection in the last month Either upper or lower (i.e., URI, bronchitis, pneumonia), with fever and antibiotic treatment 0 filed at 01/13/2025 0835   Preoperative anemia (Hgb less than 10 g/dl) 0 filed at 01/13/2025 0835   Surgical incision  0 filed at 01/13/2025 0835   Duration of surgery  16 filed at 01/13/2025 0835   Emergency Procedure  0 filed at 01/13/2025 0835   ARISCAT Total Score  19 filed at 01/13/2025 0835          Hafsa Perioperative Risk for Myocardial Infarction or Cardiac  Arrest (OPHELIA)    No data to display         Nurse Plan of Action: After Visit Summary (AVS) reviewed and patient verbalized good understanding of medications and NPO instructions.

## 2025-01-14 ENCOUNTER — ANESTHESIA (OUTPATIENT)
Dept: OPERATING ROOM | Facility: HOSPITAL | Age: 55
End: 2025-01-14
Payer: COMMERCIAL

## 2025-01-14 ENCOUNTER — ANESTHESIA EVENT (OUTPATIENT)
Dept: OPERATING ROOM | Facility: HOSPITAL | Age: 55
End: 2025-01-14
Payer: COMMERCIAL

## 2025-01-14 ENCOUNTER — HOSPITAL ENCOUNTER (OUTPATIENT)
Facility: HOSPITAL | Age: 55
Setting detail: OUTPATIENT SURGERY
Discharge: HOME | End: 2025-01-14
Attending: UROLOGY | Admitting: UROLOGY
Payer: COMMERCIAL

## 2025-01-14 VITALS
HEART RATE: 60 BPM | TEMPERATURE: 96.8 F | RESPIRATION RATE: 15 BRPM | DIASTOLIC BLOOD PRESSURE: 93 MMHG | WEIGHT: 214.95 LBS | BODY MASS INDEX: 31.29 KG/M2 | OXYGEN SATURATION: 98 % | SYSTOLIC BLOOD PRESSURE: 154 MMHG

## 2025-01-14 DIAGNOSIS — N40.1 BPH WITH OBSTRUCTION/LOWER URINARY TRACT SYMPTOMS: Primary | ICD-10-CM

## 2025-01-14 DIAGNOSIS — N32.81 OAB (OVERACTIVE BLADDER): ICD-10-CM

## 2025-01-14 DIAGNOSIS — N13.8 BPH WITH OBSTRUCTION/LOWER URINARY TRACT SYMPTOMS: Primary | ICD-10-CM

## 2025-01-14 LAB
ABO GROUP (TYPE) IN BLOOD: NORMAL
ANTIBODY SCREEN: NORMAL
RH FACTOR (ANTIGEN D): NORMAL

## 2025-01-14 PROCEDURE — 36415 COLL VENOUS BLD VENIPUNCTURE: CPT | Performed by: UROLOGY

## 2025-01-14 PROCEDURE — 52649 PROSTATE LASER ENUCLEATION: CPT | Performed by: UROLOGY

## 2025-01-14 PROCEDURE — 7100000009 HC PHASE TWO TIME - INITIAL BASE CHARGE: Performed by: UROLOGY

## 2025-01-14 PROCEDURE — 88307 TISSUE EXAM BY PATHOLOGIST: CPT | Mod: TC,AHULAB | Performed by: UROLOGY

## 2025-01-14 PROCEDURE — 3700000002 HC GENERAL ANESTHESIA TIME - EACH INCREMENTAL 1 MINUTE: Performed by: UROLOGY

## 2025-01-14 PROCEDURE — 2500000005 HC RX 250 GENERAL PHARMACY W/O HCPCS: Performed by: UROLOGY

## 2025-01-14 PROCEDURE — 2500000004 HC RX 250 GENERAL PHARMACY W/ HCPCS (ALT 636 FOR OP/ED): Performed by: INTERNAL MEDICINE

## 2025-01-14 PROCEDURE — C1889 IMPLANT/INSERT DEVICE, NOC: HCPCS | Performed by: UROLOGY

## 2025-01-14 PROCEDURE — 3700000001 HC GENERAL ANESTHESIA TIME - INITIAL BASE CHARGE: Performed by: UROLOGY

## 2025-01-14 PROCEDURE — 3600000009 HC OR TIME - EACH INCREMENTAL 1 MINUTE - PROCEDURE LEVEL FOUR: Performed by: UROLOGY

## 2025-01-14 PROCEDURE — 86901 BLOOD TYPING SEROLOGIC RH(D): CPT | Performed by: UROLOGY

## 2025-01-14 PROCEDURE — 7100000010 HC PHASE TWO TIME - EACH INCREMENTAL 1 MINUTE: Performed by: UROLOGY

## 2025-01-14 PROCEDURE — 7100000001 HC RECOVERY ROOM TIME - INITIAL BASE CHARGE: Performed by: UROLOGY

## 2025-01-14 PROCEDURE — 2720000007 HC OR 272 NO HCPCS: Performed by: UROLOGY

## 2025-01-14 PROCEDURE — 3600000004 HC OR TIME - INITIAL BASE CHARGE - PROCEDURE LEVEL FOUR: Performed by: UROLOGY

## 2025-01-14 PROCEDURE — 7100000002 HC RECOVERY ROOM TIME - EACH INCREMENTAL 1 MINUTE: Performed by: UROLOGY

## 2025-01-14 RX ORDER — SODIUM CHLORIDE, SODIUM LACTATE, POTASSIUM CHLORIDE, CALCIUM CHLORIDE 600; 310; 30; 20 MG/100ML; MG/100ML; MG/100ML; MG/100ML
100 INJECTION, SOLUTION INTRAVENOUS CONTINUOUS
Status: DISCONTINUED | OUTPATIENT
Start: 2025-01-14 | End: 2025-01-14 | Stop reason: HOSPADM

## 2025-01-14 RX ORDER — CEFAZOLIN SODIUM 2 G/100ML
2 INJECTION, SOLUTION INTRAVENOUS ONCE
Status: DISCONTINUED | OUTPATIENT
Start: 2025-01-14 | End: 2025-01-14 | Stop reason: HOSPADM

## 2025-01-14 RX ORDER — LIDOCAINE HYDROCHLORIDE 10 MG/ML
0.1 INJECTION, SOLUTION EPIDURAL; INFILTRATION; INTRACAUDAL; PERINEURAL ONCE
Status: DISCONTINUED | OUTPATIENT
Start: 2025-01-14 | End: 2025-01-14 | Stop reason: HOSPADM

## 2025-01-14 RX ORDER — PHENAZOPYRIDINE HYDROCHLORIDE 200 MG/1
200 TABLET, FILM COATED ORAL 3 TIMES DAILY PRN
Qty: 9 TABLET | Refills: 0 | Status: SHIPPED | OUTPATIENT
Start: 2025-01-14 | End: 2025-01-17

## 2025-01-14 RX ORDER — PROPOFOL 10 MG/ML
INJECTION, EMULSION INTRAVENOUS AS NEEDED
Status: DISCONTINUED | OUTPATIENT
Start: 2025-01-14 | End: 2025-01-14

## 2025-01-14 RX ORDER — METOCLOPRAMIDE HYDROCHLORIDE 5 MG/ML
10 INJECTION INTRAMUSCULAR; INTRAVENOUS ONCE AS NEEDED
Status: DISCONTINUED | OUTPATIENT
Start: 2025-01-14 | End: 2025-01-14 | Stop reason: HOSPADM

## 2025-01-14 RX ORDER — ACETAMINOPHEN 325 MG/1
650 TABLET ORAL EVERY 6 HOURS PRN
Qty: 24 TABLET | Refills: 0 | Status: SHIPPED | OUTPATIENT
Start: 2025-01-14 | End: 2025-01-17

## 2025-01-14 RX ORDER — FENTANYL CITRATE 50 UG/ML
INJECTION, SOLUTION INTRAMUSCULAR; INTRAVENOUS AS NEEDED
Status: DISCONTINUED | OUTPATIENT
Start: 2025-01-14 | End: 2025-01-14

## 2025-01-14 RX ORDER — ONDANSETRON HYDROCHLORIDE 2 MG/ML
INJECTION, SOLUTION INTRAVENOUS AS NEEDED
Status: DISCONTINUED | OUTPATIENT
Start: 2025-01-14 | End: 2025-01-14

## 2025-01-14 RX ORDER — SODIUM CHLORIDE 0.9 G/100ML
INJECTION, SOLUTION IRRIGATION AS NEEDED
Status: DISCONTINUED | OUTPATIENT
Start: 2025-01-14 | End: 2025-01-14 | Stop reason: HOSPADM

## 2025-01-14 RX ORDER — IBUPROFEN 600 MG/1
600 TABLET ORAL EVERY 6 HOURS PRN
Qty: 12 TABLET | Refills: 0 | Status: SHIPPED | OUTPATIENT
Start: 2025-01-14 | End: 2025-01-17

## 2025-01-14 RX ORDER — CEFAZOLIN 1 G/1
INJECTION, POWDER, FOR SOLUTION INTRAVENOUS AS NEEDED
Status: DISCONTINUED | OUTPATIENT
Start: 2025-01-14 | End: 2025-01-14

## 2025-01-14 RX ORDER — POLYETHYLENE GLYCOL 3350 17 G/17G
17 POWDER, FOR SOLUTION ORAL DAILY
Qty: 238 G | Refills: 0 | Status: SHIPPED | OUTPATIENT
Start: 2025-01-14 | End: 2025-01-28

## 2025-01-14 RX ORDER — SODIUM CHLORIDE 9 MG/ML
INJECTION, SOLUTION INTRAVENOUS CONTINUOUS PRN
Status: DISCONTINUED | OUTPATIENT
Start: 2025-01-14 | End: 2025-01-14

## 2025-01-14 RX ORDER — ACETAMINOPHEN 325 MG/1
650 TABLET ORAL EVERY 4 HOURS PRN
Status: DISCONTINUED | OUTPATIENT
Start: 2025-01-14 | End: 2025-01-14 | Stop reason: HOSPADM

## 2025-01-14 RX ORDER — MIDAZOLAM HYDROCHLORIDE 1 MG/ML
INJECTION INTRAMUSCULAR; INTRAVENOUS AS NEEDED
Status: DISCONTINUED | OUTPATIENT
Start: 2025-01-14 | End: 2025-01-14

## 2025-01-14 RX ORDER — LIDOCAINE HYDROCHLORIDE 20 MG/ML
INJECTION, SOLUTION EPIDURAL; INFILTRATION; INTRACAUDAL; PERINEURAL AS NEEDED
Status: DISCONTINUED | OUTPATIENT
Start: 2025-01-14 | End: 2025-01-14

## 2025-01-14 RX ORDER — OXYCODONE HYDROCHLORIDE 5 MG/1
5 TABLET ORAL EVERY 4 HOURS PRN
Status: DISCONTINUED | OUTPATIENT
Start: 2025-01-14 | End: 2025-01-14 | Stop reason: HOSPADM

## 2025-01-14 RX ADMIN — DEXAMETHASONE SODIUM PHOSPHATE 4 MG: 4 INJECTION, SOLUTION INTRAMUSCULAR; INTRAVENOUS at 12:05

## 2025-01-14 RX ADMIN — CEFAZOLIN 2 G: 1 INJECTION, POWDER, FOR SOLUTION INTRAMUSCULAR; INTRAVENOUS at 12:00

## 2025-01-14 RX ADMIN — SODIUM CHLORIDE: 900 INJECTION, SOLUTION INTRAVENOUS at 11:50

## 2025-01-14 RX ADMIN — GLYCOPYRROLATE 0.2 MG: 0.2 INJECTION, SOLUTION INTRAMUSCULAR; INTRAVENOUS at 12:10

## 2025-01-14 RX ADMIN — PROPOFOL 200 MG: 10 INJECTION, EMULSION INTRAVENOUS at 11:55

## 2025-01-14 RX ADMIN — FENTANYL CITRATE 50 MCG: 50 INJECTION, SOLUTION INTRAMUSCULAR; INTRAVENOUS at 12:15

## 2025-01-14 RX ADMIN — LIDOCAINE HYDROCHLORIDE 100 MG: 20 INJECTION, SOLUTION EPIDURAL; INFILTRATION; INTRACAUDAL; PERINEURAL at 11:55

## 2025-01-14 RX ADMIN — MIDAZOLAM HYDROCHLORIDE 2 MG: 1 INJECTION, SOLUTION INTRAMUSCULAR; INTRAVENOUS at 11:48

## 2025-01-14 RX ADMIN — ONDANSETRON 4 MG: 2 INJECTION, SOLUTION INTRAMUSCULAR; INTRAVENOUS at 12:05

## 2025-01-14 RX ADMIN — FENTANYL CITRATE 50 MCG: 50 INJECTION, SOLUTION INTRAMUSCULAR; INTRAVENOUS at 11:55

## 2025-01-14 SDOH — HEALTH STABILITY: MENTAL HEALTH: CURRENT SMOKER: 0

## 2025-01-14 ASSESSMENT — PAIN SCALES - GENERAL
PAINLEVEL_OUTOF10: 0 - NO PAIN

## 2025-01-14 ASSESSMENT — PAIN - FUNCTIONAL ASSESSMENT
PAIN_FUNCTIONAL_ASSESSMENT: 0-10

## 2025-01-14 ASSESSMENT — COLUMBIA-SUICIDE SEVERITY RATING SCALE - C-SSRS
1. IN THE PAST MONTH, HAVE YOU WISHED YOU WERE DEAD OR WISHED YOU COULD GO TO SLEEP AND NOT WAKE UP?: NO
6. HAVE YOU EVER DONE ANYTHING, STARTED TO DO ANYTHING, OR PREPARED TO DO ANYTHING TO END YOUR LIFE?: NO
2. HAVE YOU ACTUALLY HAD ANY THOUGHTS OF KILLING YOURSELF?: NO

## 2025-01-14 NOTE — INTERVAL H&P NOTE
I have reviewed the patient's History and Physical Examination.   I have personally seen and evaluated the patient, repeating key portions. There is no significant interval change.     Surgery is still indicated:  Yes    Consent reviewed and signed by patient/family:  Yes    Operative site verified and marked:  Yes  NA

## 2025-01-14 NOTE — ANESTHESIA PROCEDURE NOTES
Airway  Date/Time: 1/14/2025 11:58 AM  Urgency: elective    Airway not difficult    Staffing  Performed: CRNA   Authorized by: Kevin Kat MD    Performed by: Donovan Mansfield, APRN-CNP, APRN-CRNA  Patient location during procedure: OR    Indications and Patient Condition  Indications for airway management: anesthesia  Spontaneous ventilation: present  Sedation level: deep  Preoxygenated: yes  Patient position: sniffing  Mask difficulty assessment: 1 - vent by mask    Final Airway Details  Final airway type: supraglottic airway      Successful airway: Supraglottic airway: i-gel.  Size 5     Number of attempts at approach: 1

## 2025-01-14 NOTE — ANESTHESIA PREPROCEDURE EVALUATION
Patient: Sekou Negron    Procedure Information       Anesthesia Start Date/Time: 01/14/25 1150    Procedure: Holmium Laser Enucleation Transurethral Prostate (Prostate)    Location: [facility] OR 01 / Virtual [facility] OR    Surgeons: Barrington James MD            Relevant Problems   Cardiac   (+) Abnormal EKG   (+) Mild aortic regurgitation   (+) Mixed hyperlipidemia   (+) Murmur, heart   (+) Primary hypertension      Pulmonary   (+) Mild intermittent asthma without complication (HHS-HCC)      /Renal   (+) BPH with obstruction/lower urinary tract symptoms       Clinical information reviewed:    Allergies                 Past Medical History:   Diagnosis Date   • Asthma     albuterol once/month   • Asymmetric septal hypertrophy    • BPH (benign prostatic hyperplasia)    • Celiac disease (HHS-HCC)     childhood   • Cutaneous cyst     recurrent - suprapubic   • Diverticulitis     1 episode 3 yrs ago   • Hyperlipidemia    • Hypertension    • Hypertrophic cardiomyopathy (Multi)     ECHO 11/12/24: 1. Left ventricular ejection fraction is normal, by visual estimate at 60-65%.  2. Abnormally shaped sigmoid septum measuring 2.3 cm that could indicate hypertrophic cardiomyopathy. No evidence of MANUEL or cavity/outlet obstruction. There is acceleration across the LVOT with normal opening of the aortic valve. cMRI scheduled after surgery - pt removed to move up,   • Migraine    • Mild aortic regurgitation    • OAB (overactive bladder)    • Pre-operative clearance     cardiac clearance requested, pt being worked up for hypertrophic cardiomyopathy, cMRI sched 1/23 - refused to move up.  States cards knows he's having surgery and said fine, cards clearance requested   • Seasonal allergic rhinitis    • Tubular adenoma    • Vitamin D deficiency       Past Surgical History:   Procedure Laterality Date   • COLONOSCOPY     • UPPER GASTROINTESTINAL ENDOSCOPY       Social History     Tobacco Use   • Smoking status: Never   • Smokeless  "tobacco: Never   Vaping Use   • Vaping status: Never Used   Substance Use Topics   • Alcohol use: Yes     Alcohol/week: 7.0 standard drinks of alcohol     Types: 7 Standard drinks or equivalent per week   • Drug use: Yes     Types: Marijuana     Comment: few times/year      Current Outpatient Medications   Medication Instructions   • acetaminophen (TYLENOL) 650 mg, oral, Every 6 hours PRN   • albuterol 90 mcg/actuation inhaler 2 puffs, inhalation, Every 6 hours PRN   • atenoloL-chlorthalidone (Tenoretic) 100-25 mg tablet 1 tablet, oral, Daily   • fluticasone (Flonase) 50 mcg/actuation nasal spray Administer 2 sprays into each nostril once daily. As needed for allergies   • ibuprofen 600 mg, oral, Every 6 hours PRN   • phenazopyridine (PYRIDIUM) 200 mg, oral, 3 times daily PRN   • polyethylene glycol (GLYCOLAX, MIRALAX) 17 g, oral, Daily   • simvastatin (ZOCOR) 40 mg, oral, Daily RT   • tamsulosin (FLOMAX) 0.4 mg, oral, Daily RT      No Known Allergies     Chemistry    Lab Results   Component Value Date/Time     01/13/2025 0901    K 3.5 01/13/2025 0901     01/13/2025 0901    CO2 32 01/13/2025 0901    BUN 17 01/13/2025 0901    CREATININE 0.75 01/13/2025 0901    Lab Results   Component Value Date/Time    CALCIUM 9.4 01/13/2025 0901    ALKPHOS 40 01/13/2025 0901    AST 25 01/13/2025 0901    ALT 40 01/13/2025 0901    BILITOT 0.5 01/13/2025 0901          Lab Results   Component Value Date    HGBA1C 4.8 01/13/2025     Lab Results   Component Value Date/Time    WBC 5.4 01/13/2025 0901    HGB 15.9 01/13/2025 0901    HCT 46.1 01/13/2025 0901     01/13/2025 0901     No results found for: \"PROTIME\", \"PTT\", \"INR\"  No results found for: \"ABORH\"  Encounter Date: 01/07/25   ECG 12 Lead    Narrative    Sinus bradycardia, LVH with repolarization variant     No results found for this or any previous visit from the past 1095 days.       Visit Vitals  BP (!) 145/93   Temp 36.4 °C (97.5 °F) (Temporal)   Resp 18   Wt " 97.5 kg (214 lb 15.2 oz)   SpO2 96%   BMI 31.29 kg/m²   Smoking Status Never   BSA 2.19 m²     NPO/Void Status  Carbohydrate Drink Given Prior to Surgery? : N  Date of Last Liquid: 01/14/25  Time of Last Liquid: 0900  Date of Last Solid: 01/13/25  Time of Last Solid: 1800  Last Intake Type: Clear fluids  Time of Last Void: 1123        Physical Exam    Airway  Mallampati: II  TM distance: >3 FB  Neck ROM: full     Cardiovascular - normal exam     Dental - normal exam     Pulmonary - normal exam     Abdominal - normal exam         Anesthesia Plan    History of general anesthesia?: no  History of complications of general anesthesia?: no    ASA 2     general     The patient is not a current smoker.    intravenous induction   Postoperative administration of opioids is intended.  Anesthetic plan and risks discussed with patient.  Use of blood products discussed with patient who.    Plan discussed with CRNA and attending.

## 2025-01-14 NOTE — OP NOTE
Holmium Laser Enucleation Transurethral Prostate Operative Note     Date: 2025  OR Location: [facility] A OR    Name: Sekou Negron, : 1970, Age: 54 y.o., MRN: [id number], Sex: male    Diagnosis  Pre-op Diagnosis      * BPH with obstruction/lower urinary tract symptoms [N40.1, N13.8]     * OAB (overactive bladder) [N32.81] Post-op Diagnosis     * BPH with obstruction/lower urinary tract symptoms [N40.1, N13.8]     * OAB (overactive bladder) [N32.81]     Procedures  Holmium Laser Enucleation Transurethral Prostate  [address] - [place] LASER ENUCLEATION PROSTATE W/MORCELLATION      Surgeons      * Barrington James - Primary    Resident/Fellow/Other Assistant:  Surgeons and Role:  * No surgeons found with a matching role *    Staff:   Relief Circulator: Yarelis  Circulator: Stormy Arredondo Person: Moriah Fletcher Scrub: Mikaela    Anesthesia Staff: Anesthesiologist: Kevin Kat MD  CRNA: Mayelin Wright APRN-CRNA; LINDA Underwood-CNP, APRN-CRNA    Procedure Summary  Anesthesia: General  ASA: II  Estimated Blood Loss: 10mL  Intra-op Medications: Administrations occurring from 1230 to 1430 on 25:  * No intraprocedure medications in log *           Anesthesia Record               Intraprocedure I/O Totals          Intake    NaCl 0.9 % infusion 600.00 mL    Total Intake 600 mL       Output    Est. Blood Loss 10 mL    Total Output 10 mL       Net    Net Volume 590 mL          Specimen:   ID Type Source Tests Collected by Time   1 : prostate tissue Tissue PROSTATE HOLEP SURGICAL PATHOLOGY EXAM Barrington James MD 2025 1230                 Drains and/or Catheters:   Urethral Catheter  (Active)       Tourniquet Times:         Implants:     Findings: primary bladder neck obstruction. Middle lobe holep    Indications: Sekou Negron is an 54 y.o. male who is having surgery for BPH with obstruction/lower urinary tract symptoms [N40.1, N13.8]  OAB (overactive bladder) [N32.81].     The patient was seen in the  preoperative area. The risks, benefits, complications, treatment options, non-operative alternatives, expected recovery and outcomes were discussed with the patient. The possibilities of reaction to medication, pulmonary aspiration, injury to surrounding structures, bleeding, recurrent infection, the need for additional procedures, failure to diagnose a condition, and creating a complication requiring transfusion or operation were discussed with the patient. The patient concurred with the proposed plan, giving informed consent.  The site of surgery was properly noted/marked if necessary per policy. The patient has been actively warmed in preoperative area. Preoperative antibiotics have been ordered and given within 1 hours of incision. Venous thrombosis prophylaxis have been ordered including bilateral sequential compression devices    Procedure Details:   1. HOLMIUM LASER ENUCLEATION OF THE PROSTATE   Laser Settings Used:  Cutting 2 J, 40 Hz.  Coagulation 1.5 J, 30 Hz.   Kartik or Virtual basket used? Kartik  Preoperative Prostate Size:  approx 30 cubic centimeters.     Prostate configuration: PBO  Complication: none  EBL: 15 ml  Catheter: 22Fr 3 way  Antibiotics: ancef  Specimen: Morcellated prostatic tissue.     DESCRIPTION OF PROCEDURE:        The patient was brought tto he OR and after good general anesthesia was achieved, the patient was prepped and draped in dorsal lithotomy position. All pressure points were padded.  Surgical pause was performed.  The patient was identified using 2 identifiers.  The correct surgical procedure was confirmed.  All members of surgical and anesthesia team were in agreement.  The patient received prophylactic antibiotic and the procedure started.     Cystoscopy: The patient's bladder was first entered with a 22-Citizen of [place] rigid cystoscope with 30-degree lens.  Cystoscopic examination showed normal anterior urethra.  The posterior urethra showed PBO.  Both ureteral orifices were  identified away from the bladder neck. The cystoscope was removed and the meatus was dilated up to 28-[place] using sounds.  The urethra was then filled with lidocaine gel and a 26-[place] Douglas continuous-flow resectoscope was placed.     wall. The needle was then removed.     The holmium laser apparatus was placed through the sheath.  Using a 550-micron end-firing quartz laser fiber, a laser bridge, and a 7-[place] laser stabilizing catheter, the procedure was started with the above-mentioned laser setting.     A middle lobe technique was used, incising the prostate at the bladder neck at 5 abd 7 oclock down to the bladder neck fibers, and continuing this incision distally to just proximal to the veru montanum. We then connected the two incisions behind the veru and enucleated the middle lobe.     Once the prostate had been fully enucleated, the laser was defocused on any sources of bleeding to get additional hemostasis.  There was good hemostasis at the conclusion of this procedure.  A few small remaining nodular adenomas were then resected from the capsule.       The laser bridge apparatus and the resectoscope were then removed and the offset Douglas nephroscope and Douglas - Maggie tissue morcellator were then introduced and used to completely morcellate the tissue.  Two inflows were used during this portion of the procedure to fully distend the bladder and to prevent any inadvertent bladder injury.  The bladder was then ellik'd out after insertion of the inner sheath and reinspected with the cystoscope showing no residual adenomas.  Hemostasis was ensured at the conclusion of the procedure and both ureteral orifices were confirmed and identified well away from the area of resection.  No residual adenoma could be identified.      Following that, a three-way Hough catheter on a guide was placed into the bladder and the balloon was filled.  The bladder was irrigated near clear and the continuous irrigation was started.       The patient tolerated the procedure well and was shifted to recovery in good general condition   Complications:  None; patient tolerated the procedure well.    Disposition: PACU - hemodynamically stable.  Condition: stable                 Additional Details:     Attending Attestation: I was present and scrubbed for the entire procedure.    Barrington James  Phone Number: [phone]

## 2025-01-14 NOTE — ANESTHESIA POSTPROCEDURE EVALUATION
Patient: Sekou Negron    Procedure Summary       Date: 01/14/25 Room / Location: [facility] OR 01 / Virtual [facility] OR    Anesthesia Start: 1150 Anesthesia Stop: 1246    Procedure: Holmium Laser Enucleation Transurethral Prostate (Prostate) Diagnosis:       BPH with obstruction/lower urinary tract symptoms      OAB (overactive bladder)      (BPH with obstruction/lower urinary tract symptoms [N40.1, N13.8])      (OAB (overactive bladder) [N32.81])    Surgeons: Barrington James MD Responsible Provider: Kevin Kat MD    Anesthesia Type: general ASA Status: 2            Anesthesia Type: general    Vitals Value Taken Time   /93 01/14/25 1329   Temp 36 °C (96.8 °F) 01/14/25 1329   Pulse 60 01/14/25 1329   Resp 15 01/14/25 1329   SpO2 98 % 01/14/25 1329       Anesthesia Post Evaluation    Patient location during evaluation: PACU  Patient participation: complete - patient participated  Level of consciousness: awake  Pain management: adequate  Airway patency: patent  Cardiovascular status: acceptable  Respiratory status: acceptable  Hydration status: acceptable  Postoperative Nausea and Vomiting: none    No notable events documented.

## 2025-01-14 NOTE — ANESTHESIA PROCEDURE NOTES
Airway  Date/Time: 1/14/2025 11:58 AM  Urgency: elective    Airway not difficult    Staffing  Performed: [facility]   Authorized by: Kevin Kat MD    Performed by: Blane Enciso RN  Patient location during procedure: OR    Indications and Patient Condition  Indications for airway management: anesthesia  Spontaneous Ventilation: absent  Sedation level: deep  Preoxygenated: yes  Patient position: sniffing  MILS maintained throughout  Mask difficulty assessment: 2 - vent by mask + OA or adjuvant +/- NMBA  No planned trial extubation    Final Airway Details  Final airway type: supraglottic airway      Successful airway: air-Q  Size 5     Number of attempts at approach: 1

## 2025-01-15 ASSESSMENT — PAIN SCALES - GENERAL: PAINLEVEL_OUTOF10: 2

## 2025-01-16 ENCOUNTER — APPOINTMENT (OUTPATIENT)
Dept: UROLOGY | Facility: CLINIC | Age: 55
End: 2025-01-16
Payer: COMMERCIAL

## 2025-01-16 VITALS — DIASTOLIC BLOOD PRESSURE: 85 MMHG | HEART RATE: 55 BPM | SYSTOLIC BLOOD PRESSURE: 123 MMHG

## 2025-01-16 DIAGNOSIS — N40.1 BPH WITH OBSTRUCTION/LOWER URINARY TRACT SYMPTOMS: ICD-10-CM

## 2025-01-16 DIAGNOSIS — N13.8 BPH WITH OBSTRUCTION/LOWER URINARY TRACT SYMPTOMS: ICD-10-CM

## 2025-01-16 PROCEDURE — 3079F DIAST BP 80-89 MM HG: CPT | Performed by: NURSE PRACTITIONER

## 2025-01-16 PROCEDURE — 1036F TOBACCO NON-USER: CPT | Performed by: NURSE PRACTITIONER

## 2025-01-16 PROCEDURE — 3074F SYST BP LT 130 MM HG: CPT | Performed by: NURSE PRACTITIONER

## 2025-01-16 NOTE — PROGRESS NOTES
Subjective   Patient ID: Sekou Negron is a 54 y.o. male who presents for TOV .  Patient is status post H OL EP on January 14 with Dr. James.  He presents today for trial of void.  He is feeling quite well overall although he has been somewhat anxious and stressed regarding this process and management of catheter at home.        Review of Systems   All other systems reviewed and are negative.      Objective   Physical Exam  Vitals reviewed.     Alert and oriented x3  Moist mucous membranes  Breathes easily on room air  Abdomen soft, nondistended. Obese  No edema  No scleral icterus  No focal neurological deficits  Appears stated age, no acute distress  Hough cath draining moderate amounts of clear yellow urine    Assessment/Plan   Diagnoses and all orders for this visit:  BPH with obstruction/lower urinary tract symptoms  -     Voiding Trial    Patient presented today for trial of void.  Patient passed without difficulties. Plan to stop tamsulosin. Patient will be mindful of signs and symptoms of urinary retention and return to the office if necessary.  If these occur after hours patient will present to the emergency room.  Patient verbalized understanding of plan.           LINDA Lemus-CNP 01/16/25 9:01 AM

## 2025-01-23 ENCOUNTER — APPOINTMENT (OUTPATIENT)
Dept: RADIOLOGY | Facility: HOSPITAL | Age: 55
End: 2025-01-23
Payer: COMMERCIAL

## 2025-01-23 LAB
LABORATORY COMMENT REPORT: NORMAL
PATH REPORT.FINAL DX SPEC: NORMAL
PATH REPORT.GROSS SPEC: NORMAL
PATH REPORT.RELEVANT HX SPEC: NORMAL
PATH REPORT.TOTAL CANCER: NORMAL

## 2025-01-27 ENCOUNTER — HOSPITAL ENCOUNTER (OUTPATIENT)
Dept: RADIOLOGY | Facility: CLINIC | Age: 55
Discharge: HOME | End: 2025-01-27
Payer: COMMERCIAL

## 2025-01-27 DIAGNOSIS — I51.7 ASYMMETRIC SEPTAL HYPERTROPHY: ICD-10-CM

## 2025-01-27 DIAGNOSIS — I42.2 CARDIOMYOPATHY, HYPERTROPHIC (MULTI): ICD-10-CM

## 2025-01-27 PROCEDURE — 71555 MRI ANGIO CHEST W OR W/O DYE: CPT | Performed by: INTERNAL MEDICINE

## 2025-01-27 PROCEDURE — 2550000001 HC RX 255 CONTRASTS: Performed by: NURSE PRACTITIONER

## 2025-01-27 PROCEDURE — A9575 INJ GADOTERATE MEGLUMI 0.1ML: HCPCS | Performed by: NURSE PRACTITIONER

## 2025-01-27 PROCEDURE — 75565 CARD MRI VELOC FLOW MAPPING: CPT

## 2025-01-27 PROCEDURE — 75561 CARDIAC MRI FOR MORPH W/DYE: CPT | Performed by: INTERNAL MEDICINE

## 2025-01-27 RX ORDER — GADOTERATE MEGLUMINE 376.9 MG/ML
40 INJECTION INTRAVENOUS
Status: COMPLETED | OUTPATIENT
Start: 2025-01-27 | End: 2025-01-27

## 2025-01-27 RX ADMIN — GADOTERATE MEGLUMINE 40 ML: 376.9 INJECTION INTRAVENOUS at 09:48

## 2025-01-28 ENCOUNTER — APPOINTMENT (OUTPATIENT)
Dept: CARDIOLOGY | Facility: CLINIC | Age: 55
End: 2025-01-28
Payer: COMMERCIAL

## 2025-01-29 ENCOUNTER — APPOINTMENT (OUTPATIENT)
Dept: CARDIOLOGY | Facility: CLINIC | Age: 55
End: 2025-01-29
Payer: COMMERCIAL

## 2025-01-29 DIAGNOSIS — I34.0 MILD MITRAL VALVE REGURGITATION: ICD-10-CM

## 2025-01-29 DIAGNOSIS — I77.810 ASCENDING AORTA DILATION (CMS-HCC): ICD-10-CM

## 2025-01-29 DIAGNOSIS — I51.7 ASYMMETRIC SEPTAL HYPERTROPHY: ICD-10-CM

## 2025-01-29 DIAGNOSIS — E78.2 MIXED HYPERLIPIDEMIA: ICD-10-CM

## 2025-01-29 DIAGNOSIS — I10 PRIMARY HYPERTENSION: ICD-10-CM

## 2025-01-29 DIAGNOSIS — I11.9 BENIGN HYPERTENSIVE HEART DISEASE WITHOUT CONGESTIVE HEART FAILURE: Primary | ICD-10-CM

## 2025-01-29 PROBLEM — I42.2 CARDIOMYOPATHY, HYPERTROPHIC (MULTI): Status: RESOLVED | Noted: 2025-01-07 | Resolved: 2025-01-29

## 2025-01-29 PROCEDURE — 99214 OFFICE O/P EST MOD 30 MIN: CPT | Performed by: NURSE PRACTITIONER

## 2025-01-29 PROCEDURE — 1036F TOBACCO NON-USER: CPT | Performed by: NURSE PRACTITIONER

## 2025-01-29 RX ORDER — ATORVASTATIN CALCIUM 40 MG/1
40 TABLET, FILM COATED ORAL DAILY
Qty: 90 TABLET | Refills: 3 | Status: SHIPPED | OUTPATIENT
Start: 2025-01-29 | End: 2026-01-29

## 2025-01-29 RX ORDER — CHLORTHALIDONE 25 MG/1
25 TABLET ORAL DAILY
Qty: 90 TABLET | Refills: 3 | Status: SHIPPED | OUTPATIENT
Start: 2025-01-29 | End: 2026-01-29

## 2025-01-29 RX ORDER — SPIRONOLACTONE 25 MG/1
25 TABLET ORAL DAILY
Qty: 90 TABLET | Refills: 3 | Status: SHIPPED | OUTPATIENT
Start: 2025-01-29 | End: 2026-01-29

## 2025-01-29 NOTE — Clinical Note
Hi Dr. Gooden,  Just wanted to update you from a cardiac perspective. Please let me know if you have any questions.  Best, Hollie

## 2025-01-29 NOTE — PROGRESS NOTES
Name : Sekou Negron   : 1970   MRN : [id number]   ENC Date : 2025    Virtual Visit    CC:   Follow up cMRI     HPI:    Sekou Negron is a 54 y.o. male with PMHx sig for HTN, HLD, MR, septal hypertrophy, asthma & BPH who presents as above.    I met Dennis on referral from Dr. Gooden for abnormal echocardiogram.    Dennis saw Dr. Gooden in October to establish care. On review of history, echocardiogram done in  showed mild AR & moderate septal LVH. Dennis reports that an abdominal ultrasound done for screening to get life insurance picked up on the LVH which is what lead to his echocardiogram at that time.    Echo done 24 for surveillance showed normal intraventricular and posterior wall dimensions but mentions a sigmoid septum with increased velocity in the LVOT but normal aortic valve opening.     Reports that he was evaluated by cardiologist many years ago for chest pain, did a stress test which turned out normal    No routine exercise, but walks. 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 Never had any abnormal SOB or syncopal episodes.    Does have exercise induced asthma that's been well managed     CV Diagnostics:  cMRI 25:   1. Normal left ventricular cavity size with preserved systolic  function. Ejection fraction 64%.  2. Asymmetric septal left ventricular hypertrophy. Basal anterior  septum 1.4 cm.  3. Delayed enhancement imaging is normal. No evidence of fibrosis,  infiltrative disease, previous myocardial infarction, or inflammation  of the left ventricular or right ventricular myocardium.  4. Normal right ventricular cavity size with preserved systolic  function. RV EF 63%.  5. Dilated ascending thoracic aorta 41 mm.  6. Sclerotic trileaflet aortic valve without stenosis, mild aortic  valve insufficiency.  7. Mild mitral valve regurgitation.  8. Mild tricuspid valve regurgitation.    Echo 24:   1. Left ventricular ejection fraction is normal, by visual  estimate at 60-65%.   2. Abnormally shaped sigmoid septum measuring 2.3 cm that could indicate hypertrophic cardiomyopathy. No evidence of MANUEL or cavity/outlet obstruction. There is acceleration across the LVOT with normal opening of the aortic valve.   3. There is normal right ventricular global systolic function.   4. Right ventricular systolic pressure is within normal limits.   5. Mild aortic valve regurgitation.    ROS: unless otherwise noted in the history of present illness, all other systems were reviewed and they are negative for complaints     PMH:  As above    PSH:  noncontributory    SHx:  He reports that he has never smoked. He has never used smokeless tobacco. He reports current alcohol use of about 7.0 standard drinks of alcohol per week. He reports current drug use. Drug: Marijuana.    FHx:  Family History   Adopted: Yes      Allergies:  Patient has no known allergies.    Outpatient Medications:  Current Outpatient Medications   Medication Instructions    albuterol 90 mcg/actuation inhaler 2 puffs, inhalation, Every 6 hours PRN    atenoloL-chlorthalidone (Tenoretic) 100-25 mg tablet 1 tablet, oral, Daily    atorvastatin (LIPITOR) 40 mg, oral, Daily    chlorthalidone (HYGROTON) 25 mg, oral, Daily    ergocalciferol (VITAMIN D-2) 50,000 Units, oral, Weekly    fluticasone (Flonase) 50 mcg/actuation nasal spray Administer 2 sprays into each nostril once daily. As needed for allergies    spironolactone (ALDACTONE) 25 mg, oral, Daily     Last Recorded Vitals:  There were no vitals filed for this visit.    Physical Exam:  A+Ox3, NAD     Last Labs:  CBC -  Lab Results   Component Value Date    WBC 5.4 01/13/2025    HGB 15.9 01/13/2025    HCT 46.1 01/13/2025    MCV 91 01/13/2025     01/13/2025       CMP -  Lab Results   Component Value Date    CALCIUM 9.4 01/13/2025    PROT 6.9 01/13/2025    ALBUMIN 4.5 01/13/2025    AST 25 01/13/2025    ALT 40 01/13/2025    ALKPHOS 40 01/13/2025    BILITOT 0.5  01/13/2025       LIPID PANEL -   Lab Results   Component Value Date    CHOL 152 01/13/2025    TRIG 125 01/13/2025    HDL 46.8 01/13/2025    CHHDL 3.2 01/13/2025    VLDL 25 01/13/2025    NHDL 105 01/13/2025       RENAL FUNCTION PANEL -   Lab Results   Component Value Date    GLUCOSE 97 01/13/2025     01/13/2025    K 3.5 01/13/2025     01/13/2025    CO2 32 01/13/2025    ANIONGAP 11 01/13/2025    BUN 17 01/13/2025    CREATININE 0.75 01/13/2025    CALCIUM 9.4 01/13/2025    ALBUMIN 4.5 01/13/2025        Lab Results   Component Value Date    HGBA1C 4.8 01/13/2025     I have reviewed the above labs & diagnostics    Assessment/Plan:  Aortic Valve Insufficiency. Mild by most recent echocardiogram. Asymptomatic    Hypertensive Heart Disease. Septal hypertrophy. Sigmoid septum. Notable acceleration across the LVOT on recent echocardiogram. cMRI did not show any evidence of fibrosis or infiltrative disease. He is asymptomatic. Likely hypertensive heart disease. Optimize BP, must watch diuretic use. Plan to repeat a limited echo with valsalva in 1 year or sooner if he becomes asymptomatic. If there is a gradient on repeat echo, then will send to Dr Kendall with HCM team. Dr Kendall & I have discussed his case, agrees with Plan. See HTN plan below    Hypertension.   - increase chlorthalidone to 50mg every day  - as K is 3.5, will start spironolactone 25mg every day  - CMP 7-10 days  - c/w Atenolol 100 mg QD   - referral to sleep medicine to assess for ANA.    Murmur. Consistent with aortic insufficiency. Work up as above    Dilated Ascending Aorta. Measuring 4.1 cm on cMRI. Plan to repeat imaging in 1-2 years. Aggressive risk factor modification    Hyperlipidemia. LDL 80, would like to see him < 70. Stop simvastatin & start atorvastatin. CMP 1 week    Follow up with Dr. Domingo in 2 weeks to establish collaborative care, with CMP prior.    Tracy M Schwab, APRN-CNP

## 2025-01-29 NOTE — PATIENT INSTRUCTIONS
- start additional chlorthalidone 25mg once a day  - start spironolactone 25mg once a day  - repeat CMP in 7-10 days  - referral to sleep medicine to evaluate for sleep apnea as sleep apnea can cause hypertensive heart disease  - stop simvistatin & start Atorvastatin  - Follow up with Dr. Domingo in 2 weeks    It was so nice to see you! As always, I look forward to being your cardiac Nurse Practitioner. I am a huge believer in communicating with my patients. Please contact me at any time, if anything is not clear to you regarding anything we have discussed, or if new questions occur to you.

## 2025-01-30 ENCOUNTER — APPOINTMENT (OUTPATIENT)
Dept: UROLOGY | Facility: CLINIC | Age: 55
End: 2025-01-30
Payer: COMMERCIAL

## 2025-01-30 DIAGNOSIS — N13.8 BPH WITH OBSTRUCTION/LOWER URINARY TRACT SYMPTOMS: Primary | ICD-10-CM

## 2025-01-30 DIAGNOSIS — N40.1 BPH WITH OBSTRUCTION/LOWER URINARY TRACT SYMPTOMS: Primary | ICD-10-CM

## 2025-01-30 DIAGNOSIS — R39.12 WEAK URINARY STREAM: ICD-10-CM

## 2025-01-30 PROCEDURE — 99024 POSTOP FOLLOW-UP VISIT: CPT | Performed by: NURSE PRACTITIONER

## 2025-01-30 NOTE — PROGRESS NOTES
Subjective   Patient ID: Sekou Negron is a 54 y.o. male who presents for PVR Check .  Patient is status post H OL EP on January 14 with Dr. James.  He passed in office TOV a few days after. He states he has been doing very well. Notices a significant improvement in his stream. Now able to stand to urinate. Some mild dysuria which is improving daily.         Review of Systems   All other systems reviewed and are negative.      Objective   Physical Exam  Vitals reviewed.     Alert and oriented x3  Moist mucous membranes  Breathes easily on room air  Abdomen soft, nondistended  No edema  No scleral icterus  No focal neurological deficits  Appears stated age, no acute distress      Assessment/Plan   Diagnoses and all orders for this visit:  BPH with obstruction/lower urinary tract symptoms  -     Post-Void Residual  Weak urinary stream    Given reassurance at this time regarding postop healing. Will follow up in 6 months or sooner if needed. Patient verbalized understanding of plan.          LINDA Lemus-CNP 01/30/25 4:36 PM

## 2025-02-12 LAB
ALBUMIN SERPL-MCNC: 5.1 G/DL (ref 3.6–5.1)
ALP SERPL-CCNC: 53 U/L (ref 35–144)
ALT SERPL-CCNC: 44 U/L (ref 9–46)
ANION GAP SERPL CALCULATED.4IONS-SCNC: 13 MMOL/L (CALC) (ref 7–17)
AST SERPL-CCNC: 26 U/L (ref 10–35)
BILIRUB SERPL-MCNC: 1.1 MG/DL (ref 0.2–1.2)
BUN SERPL-MCNC: 19 MG/DL (ref 7–25)
CALCIUM SERPL-MCNC: 10.2 MG/DL (ref 8.6–10.3)
CHLORIDE SERPL-SCNC: 99 MMOL/L (ref 98–110)
CO2 SERPL-SCNC: 29 MMOL/L (ref 20–32)
CREAT SERPL-MCNC: 0.84 MG/DL (ref 0.7–1.3)
EGFRCR SERPLBLD CKD-EPI 2021: 103 ML/MIN/1.73M2
GLUCOSE SERPL-MCNC: 88 MG/DL (ref 65–99)
POTASSIUM SERPL-SCNC: 4.1 MMOL/L (ref 3.5–5.3)
PROT SERPL-MCNC: 8.1 G/DL (ref 6.1–8.1)
SODIUM SERPL-SCNC: 141 MMOL/L (ref 135–146)

## 2025-02-13 ENCOUNTER — OFFICE VISIT (OUTPATIENT)
Dept: CARDIOLOGY | Facility: CLINIC | Age: 55
End: 2025-02-13
Payer: COMMERCIAL

## 2025-02-13 VITALS
BODY MASS INDEX: 30.64 KG/M2 | HEART RATE: 83 BPM | SYSTOLIC BLOOD PRESSURE: 140 MMHG | WEIGHT: 214 LBS | OXYGEN SATURATION: 93 % | HEIGHT: 70 IN | DIASTOLIC BLOOD PRESSURE: 90 MMHG

## 2025-02-13 DIAGNOSIS — I77.810 ASCENDING AORTA DILATION (CMS-HCC): ICD-10-CM

## 2025-02-13 DIAGNOSIS — E78.2 MIXED HYPERLIPIDEMIA: ICD-10-CM

## 2025-02-13 PROCEDURE — 3080F DIAST BP >= 90 MM HG: CPT | Performed by: INTERNAL MEDICINE

## 2025-02-13 PROCEDURE — 3077F SYST BP >= 140 MM HG: CPT | Performed by: INTERNAL MEDICINE

## 2025-02-13 PROCEDURE — 3008F BODY MASS INDEX DOCD: CPT | Performed by: INTERNAL MEDICINE

## 2025-02-13 PROCEDURE — 99214 OFFICE O/P EST MOD 30 MIN: CPT | Performed by: INTERNAL MEDICINE

## 2025-02-13 RX ORDER — ATORVASTATIN CALCIUM 40 MG/1
40 TABLET, FILM COATED ORAL NIGHTLY
Qty: 90 TABLET | Refills: 3 | Status: SHIPPED | OUTPATIENT
Start: 2025-02-13 | End: 2026-02-13

## 2025-02-13 RX ORDER — ATENOLOL 100 MG/1
100 TABLET ORAL DAILY
Qty: 90 TABLET | Refills: 3 | Status: SHIPPED | OUTPATIENT
Start: 2025-02-13 | End: 2026-02-13

## 2025-02-13 RX ORDER — LOSARTAN POTASSIUM 25 MG/1
25 TABLET ORAL DAILY
Qty: 90 TABLET | Refills: 3 | Status: SHIPPED | OUTPATIENT
Start: 2025-02-13 | End: 2026-02-13

## 2025-02-13 NOTE — PROGRESS NOTES
Name : Sekou Negron    : 1970   MRN : [id number]   ENC Date : 25     Reason for visit: Abnormal echocardiogram    Assessment and Plan:  Abnormal echocardiogram: Patient has mild asymmetric left ventricular hypertrophy that is more likely to be related to hypertension then it is hypertrophic cardiomyopathy.  His cardiac MRI did not suggest hypertrophic cardiomyopathy.  The interventricular septum maximal diameter was only 1.4 cm.  This places him at low risk for hypertrophic cardiomyopathy and low risk for any sudden cardiac death events.  I do not think further workup is needed.  Hypertension: I do think we could make some changes to his medical regimen that would be better long-term.  Continuing the atenolol is reasonable as he is tolerating it well.  This is not my first choice for blood pressure control but it is working and he is tolerating it so we can continue that.  I would favor stopping the chlorthalidone in favor of angiotensin receptor blocker.  Even though he does not have outflow tract obstructive physiology dehydration and decreased left ventricular cavity size could be provoked by being relatively intravascularly dry.  Therefore I think stopping the chlorthalidone and adding losartan for blood pressure control makes more sense.  For now can continue spironolactone.  Patient was agreeable with these medication changes.  Mildly dilated aortic root: Ascending thoracic aorta measures 4.1 cm.  Patient has a trileaflet aortic valve.  The likelihood that this will progress to need surgical intervention is very low.  Only therapy needed now is blood pressure control.  No imaging is needed for couple of years given the fact the measurement is only 4.1 cm now.  Mild mitral and tricuspid regurgitation: This is mild and unlikely to progress.  There is no structural valve disease seen.  Disp: RTO in 1 year or sooner if needed      HPI:  Patient is seen today to establish cardiac care.  He saw  cardiology remotely in the past and then saw Tracy Schwab a couple of times earlier this year for abnormal echocardiogram.  His echocardiogram shows mild asymmetric left ventricular septal hypertrophy.  She ordered a cardiac MRI which shows mild asymmetric left ventricular hypertrophy with the septum measuring 1.4 cm.  There is no MRI evidence or findings to suggest hypertrophic cardiomyopathy.  Patient is adopted so we do not have a family history to go off of.  That said patient has never had any syncopal events.  It is much more likely this is simple hypertensive heart disease changes that it is underlying genetic disorder.  Patient has no lower extremity edema.  No orthopnea nor PND.  No chest discomfort.      Problem List:   Patient Active Problem List   Diagnosis    Seasonal allergic rhinitis    Mild intermittent asthma without complication (HHS-HCC)    BPH with obstruction/lower urinary tract symptoms    Celiac disease (HHS-HCC)    Primary hypertension    Mixed hyperlipidemia    Migraine with aura and without status migrainosus, not intractable    Vitamin D deficiency    Tubular adenoma of colon    Microscopic hematuria    Mild aortic regurgitation    Asymmetric septal hypertrophy    Weak urinary stream    OAB (overactive bladder)    Contusion of foot    Lateral epicondylitis    Sprain of calcaneofibular ligament    Murmur, heart    Abnormal EKG    Benign hypertensive heart disease without congestive heart failure    Mild mitral valve regurgitation        Meds:   Current Outpatient Medications on File Prior to Visit   Medication Sig Dispense Refill    albuterol 90 mcg/actuation inhaler Inhale 2 puffs every 6 hours if needed for wheezing. 18 g 3    ergocalciferol (Vitamin D-2) 1.25 MG (97949 UT) capsule Take 1 capsule (50,000 Units) by mouth 1 (one) time per week. 12 capsule 3    fluticasone (Flonase) 50 mcg/actuation nasal spray Administer 2 sprays into each nostril once daily. As needed for allergies       spironolactone (Aldactone) 25 mg tablet Take 1 tablet (25 mg) by mouth once daily. 90 tablet 3    [DISCONTINUED] atenoloL-chlorthalidone (Tenoretic) 100-25 mg tablet Take 1 tablet by mouth once daily. 90 tablet 1    [DISCONTINUED] atorvastatin (Lipitor) 40 mg tablet Take 1 tablet (40 mg) by mouth once daily. 90 tablet 3    [DISCONTINUED] chlorthalidone (Hygroton) 25 mg tablet Take 1 tablet (25 mg) by mouth once daily. 90 tablet 3     No current facility-administered medications on file prior to visit.       All: No Known Allergies    Fam Hx:   Family History   Adopted: Yes       Soc Hx:   Social History     Socioeconomic History    Marital status:      Spouse name: Not on file    Number of children: Not on file    Years of education: Not on file    Highest education level: Not on file   Occupational History    Not on file   Tobacco Use    Smoking status: Never    Smokeless tobacco: Never   Vaping Use    Vaping status: Never Used   Substance and Sexual Activity    Alcohol use: Yes     Alcohol/week: 7.0 standard drinks of alcohol     Types: 7 Standard drinks or equivalent per week    Drug use: Yes     Types: Marijuana     Comment: few times/year    Sexual activity: Not on file   Other Topics Concern    Not on file   Social History Narrative    Not on file     Social Drivers of Health     Financial Resource Strain: Low Risk  (12/13/2023)    Received from [facility]    Overall Financial Resource Strain (CARDIA)     Difficulty of Paying Living Expenses: Not hard at all   Food Insecurity: No Food Insecurity (12/13/2023)    Received from [facility]    Hunger Vital Sign     Worried About Running Out of Food in the Last Year: Never true     Ran Out of Food in the Last Year: Never true   Transportation Needs: No Transportation Needs (12/13/2023)    Received from [facility]    PRAPARE - Transportation     Lack of Transportation (Medical): No      "Lack of Transportation (Non-Medical): No   Physical Activity: Insufficiently Active (12/13/2023)    Received from [facility]    Exercise Vital Sign     Days of Exercise per Week: 3 days     Minutes of Exercise per Session: 20 min   Stress: Stress Concern Present (12/13/2023)    Received from [facility]    [employer] - Occupational Stress Questionnaire     Feeling of Stress : To some extent   Social Connections: Socially Integrated (12/13/2023)    Received from [facility]    Social Connection and Isolation Panel [NHANES]     Frequency of Communication with Friends and Family: Twice a week     Frequency of Social Gatherings with Friends and Family: Once a week     Attends Mandaeism Services: 1 to 4 times per year     Active Member of Clubs or Organizations: Yes     Attends Club or Organization Meetings: 1 to 4 times per year     Marital Status:    Intimate Partner Violence: Not on file   Housing Stability: Low Risk  (12/13/2023)    Received from [facility]    Housing Stability Vital Sign     Unable to Pay for Housing in the Last Year: No     Number of Places Lived in the Last Year: 1     Unstable Housing in the Last Year: No       VS: /90 (BP Location: Left arm, Patient Position: Sitting)   Pulse 83   Ht 1.778 m (5' 10\")   Wt 97.1 kg (214 lb)   SpO2 93%   BMI 30.71 kg/m²      Physical Exam  Vitals reviewed.   Constitutional:       Appearance: Normal appearance.   Eyes:      Pupils: Pupils are equal, round, and reactive to light.   Neck:      Vascular: No JVD.   Cardiovascular:      Rate and Rhythm: Normal rate and regular rhythm.      Pulses: Normal pulses.      Heart sounds: Murmur heard.      Systolic murmur is present with a grade of 1/6.      No gallop.   Pulmonary:      Effort: No respiratory distress.      Breath sounds: No wheezing or rales.   Abdominal:      General: " Abdomen is flat. There is no distension.      Palpations: Abdomen is soft.   Musculoskeletal:         General: No swelling.      Right lower leg: No edema.      Left lower leg: No edema.   Neurological:      General: No focal deficit present.      Mental Status: He is alert.   Psychiatric:         Mood and Affect: Mood normal.            Encounter Date: 01/07/25   ECG 12 Lead    Narrative    Sinus bradycardia, LVH with repolarization variant        ECHO: Results for orders placed during the hospital encounter of 11/12/24    Transthoracic Echo (TTE) Complete    Narrative  [facility], [address] [place] [address]  Tel [phone] and Fax [phone]    TRANSTHORACIC ECHOCARDIOGRAM REPORT      Patient Name:       GLORIA MCPHERSON      Reading Physician:    [id number] Addison Haynes MD  Study Date:         11/12/2024          Ordering Provider:    [id number] LESLIE GLORIA  MRN/PID:            [id number]            Fellow:  Accession#:         [id number]        Nurse:  Date of Birth/Age:  1970 / 54      Sonographer:          Jossie Knowles RCS  years  Gender assigned at                     Additional Staff:  Birth:  Height:             177.80 cm           Admit Date:  Weight:             94.80 kg            Admission Status:     Outpatient  BSA / BMI:          2.13 m2 / 29.99     Encounter#:           [id number]  kg/m2  Blood Pressure:     140/85 mmHg         Department Location:  [facility]    Study Type:    TRANSTHORACIC ECHO (TTE) COMPLETE  Diagnosis/ICD: Nonrheumatic aortic (valve) insufficiency-I35.1; Ventricular  enlargement-I51.7  Indication:    Aortic Regurgitation; Asymmetric Septal Hypertrophy  CPT Code:      Echo Complete w Full [id number]    Patient History:  Valve Disorders:   Aortic Insufficiency.  Pertinent History: Hyperlipidemia and HTN. Asthma, Celiac disease.    Study Detail: The following Echo studies were performed: 2D, M-Mode, Doppler and  color flow. Technically  challenging study due to prominent lung  artifact.      PHYSICIAN INTERPRETATION:  Left Ventricle: Left ventricular ejection fraction is normal, by visual estimate at 60-65%. There are no regional left ventricular wall motion abnormalities. The left ventricular cavity size is normal. There is normal septal and normal posterior left ventricular wall thickness. Spectral Doppler shows a normal pattern of left ventricular diastolic filling. Abnormally shaped sigmoid septum measuring 2.3 cm that could indicate hypertrophic cardiomyopathy. No evidence of MANUEL or cavity/outlet obstruction. There is acceleration across the LVOT with normal opening of the aortic valve.  Left Atrium: The left atrium is normal in size.  Right Ventricle: The right ventricle is normal in size. There is normal right ventricular global systolic function.  Right Atrium: The right atrium is normal in size.  Aortic Valve: The aortic valve is probably trileaflet. The aortic valve dimensionless index is 0.49. There is mild aortic valve regurgitation. The peak instantaneous gradient of the aortic valve is 20 mmHg. The mean gradient of the aortic valve is 11 mmHg.  Mitral Valve: The mitral valve is normal in structure. There is trace mitral valve regurgitation.  Tricuspid Valve: The tricuspid valve is structurally normal. There is normal tricuspid valve leaflet mobility. There is mild tricuspid regurgitation. The Doppler estimated RVSP is within normal limits at 26.0 mmHg.  Pulmonic Valve: The pulmonic valve is structurally normal. There is physiologic pulmonic valve regurgitation.  Pericardium: Trivial pericardial effusion.  Aorta: The aortic root is normal. The Asc Ao is 4.20 cm. The thoracic aorta diam is 3.0 cm. There is mild dilatation of the ascending aorta.      CONCLUSIONS:  1. Left ventricular ejection fraction is normal, by visual estimate at 60-65%.  2. Abnormally shaped sigmoid septum measuring 2.3 cm that could indicate hypertrophic  cardiomyopathy. No evidence of MANUEL or cavity/outlet obstruction. There is acceleration across the LVOT with normal opening of the aortic valve.  3. There is normal right ventricular global systolic function.  4. Right ventricular systolic pressure is within normal limits.  5. Mild aortic valve regurgitation.    QUANTITATIVE DATA SUMMARY:    2D MEASUREMENTS:          Normal Ranges:  LAs:             4.76 cm  (2.7-4.0cm)  RVIDd:           2.95 cm  (0.9-3.6cm)  IVSd:            0.96 cm  (0.6-1.1cm)  LVPWd:           0.95 cm  (0.6-1.1cm)  LVIDd:           5.90 cm  (3.9-5.9cm)  LVIDs:           3.65 cm  LV Mass Index:   107 g/m2  LVEDV Index:     70 ml/m2  LV % FS          38.1 %      LA VOLUME:                    Normal Ranges:  LA Vol A4C:        49.5 ml    (22+/-6mL/m2)  LA Vol A2C:        49.7 ml  LA Vol BP:         51.8 ml  LA Vol Index A4C:  23.3 ml/m2  LA Vol Index A2C:  23.4 ml/m2  LA Vol Index BP:   24.3 ml/m2  LA Area A4C:       17.3 cm2  LA Area A2C:       18.1 cm2  LA Major Axis A4C: 5.1 cm  LA Major Axis A2C: 5.6 cm  LA Volume Index:   24.5 ml/m2  LA Vol A4C:        43.1 ml  LA Vol A2C:        47.6 ml  LA Vol Index BSA:  21.3 ml/m2      RA VOLUME BY A/L METHOD:            Normal Ranges:  RA Vol A4C:              30.9 ml    (8.3-19.5ml)  RA Vol Index A4C:        14.6 ml/m2  RA Area A4C:             12.8 cm2  RA Major Axis A4C:       4.5 cm      AORTA MEASUREMENTS:         Normal Ranges:  Ao Sinus, d:        3.60 cm (2.1-3.5cm)  Asc Ao, d:          4.20 cm (2.1-3.4cm)  Ao Arch:            3.00 cm (2.0-3.6cm)      LV SYSTOLIC FUNCTION BY 2D PLANIMETRY (MOD):  Normal Ranges:  EF-A4C View:    68 % (>=55%)  EF-A2C View:    68 %  EF-Biplane:     68 %  EF-Visual:      63 %  LV EF Reported: 63 %      LV DIASTOLIC FUNCTION:             Normal Ranges:  MV Peak E:             0.53 m/s    (0.7-1.2 m/s)  MV Peak A:             0.45 m/s    (0.42-0.7 m/s)  E/A Ratio:             1.18        (1.0-2.2)  MV e'                   0.080 m/s   (>8.0)  MV lateral e'          0.10 m/s  MV medial e'           0.06 m/s  MV A Dur:              134.95 msec  E/e' Ratio:            6.68        (<8.0)  PulmV Sys Shawn:         41.30 cm/s  PulmV Moran Shawn:        38.07 cm/s  PulmV S/D Shawn:         1.08  PulmV A Revs Shawn:      20.98 cm/s  PulmV A Revs Dur:      103.81 msec      MITRAL VALVE:          Normal Ranges:  MV DT:        181 msec (150-240msec)      AORTIC VALVE:                      Normal Ranges:  AoV Vmax:                2.22 m/s  (<=1.7m/s)  AoV Peak P.6 mmHg (<20mmHg)  AoV Mean P.2 mmHg (1.7-11.5mmHg)  LVOT Max Shawn:            0.93 m/s  (<=1.1m/s)  AoV VTI:                 41.24 cm  (18-25cm)  LVOT VTI:                20.08 cm  LVOT Diameter:           2.08 cm   (1.8-2.4cm)  AoV Area, VTI:           1.65 cm2  (2.5-5.5cm2)  AoV Area,Vmax:           1.43 cm2  (2.5-4.5cm2)  AoV Area, planim:        2.40 cm2  (2.5-4.5cm2)  AoV Dimensionless Index: 0.49      AORTIC INSUFFICIENCY:  AI Vmax:       3.67 m/s  AI Half-time:  512 msec  AI Decel Time: 1765 msec  AI Decel Rate: 208.25 cm/s2      RIGHT VENTRICLE:  RV Basal 4.00 cm  RV Mid   3.00 cm  RV Major 7.2 cm  TAPSE:   23.0 mm  RV s'    0.09 m/s      TRICUSPID VALVE/RVSP:          Normal Ranges:  Peak TR Velocity:     2.40 m/s  Est. RA Pressure:     3 mmHg  RV Syst Pressure:     26 mmHg  (< 30mmHg)  IVC Diam:             2.00 cm      PULMONIC VALVE:          Normal Ranges:  PV Accel Time:  87 msec  (>120ms)  PV Max Shawn:     1.5 m/s  (0.6-0.9m/s)  PV Max P.6 mmHg  PV Mean P.1 mmHg  PV VTI:         27.47 cm      Pulmonary Veins:  PulmV A Revs Dur: 103.81 msec  PulmV A Revs Shawn: 20.98 cm/s  PulmV Moran Shawn:   38.07 cm/s  PulmV S/D Shawn:    1.08  PulmV Sys Shawn:    41.30 cm/s      AORTA:  Asc Ao Diam 4.22 cm      [id number] Addison Haynes MD  Electronically signed on 11/15/2024 at 1:47:26 PM        ** Final **    Cardiac MRI:  IMPRESSION:  1. Normal left  ventricular cavity size with preserved systolic  function. Ejection fraction 64%.  2. Asymmetric septal left ventricular hypertrophy. Basal anterior  septum 1.4 cm.  3. Delayed enhancement imaging is normal. No evidence of fibrosis,  infiltrative disease, previous myocardial infarction, or inflammation  of the left ventricular or right ventricular myocardium.  4. Normal right ventricular cavity size with preserved systolic  function. RV EF 63%.  5. Dilated ascending thoracic aorta 41 mm.  6. Sclerotic trileaflet aortic valve without stenosis, mild aortic  valve insufficiency.  7. Mild mitral valve regurgitation.  8. Mild tricuspid valve regurgitation.      Jt Domingo MD

## 2025-03-06 ENCOUNTER — APPOINTMENT (OUTPATIENT)
Dept: RADIOLOGY | Facility: HOSPITAL | Age: 55
End: 2025-03-06
Payer: COMMERCIAL

## 2025-04-09 ENCOUNTER — LAB (OUTPATIENT)
Dept: LAB | Facility: HOSPITAL | Age: 55
End: 2025-04-09
Payer: COMMERCIAL

## 2025-04-09 DIAGNOSIS — I10 PRIMARY HYPERTENSION: ICD-10-CM

## 2025-04-09 DIAGNOSIS — E55.9 VITAMIN D DEFICIENCY: ICD-10-CM

## 2025-04-09 LAB
25(OH)D3 SERPL-MCNC: 66 NG/ML (ref 30–100)
ANION GAP SERPL CALC-SCNC: 13 MMOL/L (ref 10–20)
BUN SERPL-MCNC: 15 MG/DL (ref 6–23)
CALCIUM SERPL-MCNC: 9.6 MG/DL (ref 8.6–10.6)
CHLORIDE SERPL-SCNC: 102 MMOL/L (ref 98–107)
CO2 SERPL-SCNC: 29 MMOL/L (ref 21–32)
CREAT SERPL-MCNC: 0.89 MG/DL (ref 0.5–1.3)
EGFRCR SERPLBLD CKD-EPI 2021: >90 ML/MIN/1.73M*2
GLUCOSE SERPL-MCNC: 95 MG/DL (ref 74–99)
POTASSIUM SERPL-SCNC: 4.5 MMOL/L (ref 3.5–5.3)
SODIUM SERPL-SCNC: 139 MMOL/L (ref 136–145)
URATE SERPL-MCNC: 7.4 MG/DL (ref 4–7.5)

## 2025-04-09 PROCEDURE — 80048 BASIC METABOLIC PNL TOTAL CA: CPT

## 2025-04-09 PROCEDURE — 82306 VITAMIN D 25 HYDROXY: CPT

## 2025-04-09 PROCEDURE — 84550 ASSAY OF BLOOD/URIC ACID: CPT

## 2025-04-11 ENCOUNTER — APPOINTMENT (OUTPATIENT)
Dept: PRIMARY CARE | Facility: CLINIC | Age: 55
End: 2025-04-11
Payer: COMMERCIAL

## 2025-04-11 VITALS
BODY MASS INDEX: 30.42 KG/M2 | OXYGEN SATURATION: 97 % | HEART RATE: 65 BPM | WEIGHT: 212 LBS | SYSTOLIC BLOOD PRESSURE: 118 MMHG | DIASTOLIC BLOOD PRESSURE: 78 MMHG

## 2025-04-11 DIAGNOSIS — E55.9 VITAMIN D DEFICIENCY: ICD-10-CM

## 2025-04-11 DIAGNOSIS — N40.1 BPH WITH OBSTRUCTION/LOWER URINARY TRACT SYMPTOMS: ICD-10-CM

## 2025-04-11 DIAGNOSIS — I51.7 ASYMMETRIC SEPTAL HYPERTROPHY: ICD-10-CM

## 2025-04-11 DIAGNOSIS — I10 PRIMARY HYPERTENSION: Primary | ICD-10-CM

## 2025-04-11 DIAGNOSIS — N13.8 BPH WITH OBSTRUCTION/LOWER URINARY TRACT SYMPTOMS: ICD-10-CM

## 2025-04-11 DIAGNOSIS — G43.109 MIGRAINE WITH AURA AND WITHOUT STATUS MIGRAINOSUS, NOT INTRACTABLE: ICD-10-CM

## 2025-04-11 DIAGNOSIS — E78.2 MIXED HYPERLIPIDEMIA: ICD-10-CM

## 2025-04-11 DIAGNOSIS — J30.2 SEASONAL ALLERGIC RHINITIS, UNSPECIFIED TRIGGER: ICD-10-CM

## 2025-04-11 DIAGNOSIS — Z00.00 WELLNESS EXAMINATION: Primary | ICD-10-CM

## 2025-04-11 PROBLEM — R39.12 WEAK URINARY STREAM: Status: RESOLVED | Noted: 2024-11-15 | Resolved: 2025-04-11

## 2025-04-11 PROBLEM — R94.31 ABNORMAL EKG: Status: RESOLVED | Noted: 2025-01-07 | Resolved: 2025-04-11

## 2025-04-11 PROBLEM — R01.1 MURMUR, HEART: Status: RESOLVED | Noted: 2025-01-07 | Resolved: 2025-04-11

## 2025-04-11 PROBLEM — S93.419A SPRAIN OF CALCANEOFIBULAR LIGAMENT: Status: RESOLVED | Noted: 2017-11-14 | Resolved: 2025-04-11

## 2025-04-11 PROCEDURE — 3074F SYST BP LT 130 MM HG: CPT | Performed by: INTERNAL MEDICINE

## 2025-04-11 PROCEDURE — 99213 OFFICE O/P EST LOW 20 MIN: CPT | Performed by: INTERNAL MEDICINE

## 2025-04-11 PROCEDURE — 3078F DIAST BP <80 MM HG: CPT | Performed by: INTERNAL MEDICINE

## 2025-04-11 PROCEDURE — 1036F TOBACCO NON-USER: CPT | Performed by: INTERNAL MEDICINE

## 2025-04-11 ASSESSMENT — ENCOUNTER SYMPTOMS
FATIGUE: 0
DIARRHEA: 0
DIZZINESS: 0
HEADACHES: 0
DYSURIA: 0
PALPITATIONS: 0
CONSTIPATION: 0
SHORTNESS OF BREATH: 0

## 2025-04-11 NOTE — PROGRESS NOTES
Subjective   Patient ID: Sekou Negron is a 55 y.o. male who presents for Follow-up.    Primary hypertension  Medication adjustments have been made with Dr. Domingo who discontinued chlorthalidone and started losartan.  Spironolactone and atenolol were continued.  Rationale for change with removal of diuretic was due to asymmetric septal hypertrophy    Asymmetric septal hypertrophy  No symptoms of dyspnea, syncope or near syncope, etc.  Next follow-up with cardiology, Dr. Jt Domingo, in 2/2026    Hyperlipidemia  Compliant with medication.    Seasonal allergic rhinitis  No current symptoms, though typically start in the later spring.  Uses Flonase as needed    BPH status post HoLEP laser (1/13/2025)  Very good results from procedure.  Resolved hesitancy, urgency, and nocturia    Migraine headaches with aura  Patient had onset of aura at the start of today's visit which resolved by the end of the visit.  Usually he has a mild headache.  No associated nausea or vomiting.  Pattern has been the same, onset of headaches when he was in his 20s.  Various medications including the triptans have had side effects or were ineffective.    Currently, he does not take any over-the-counter treatment for these headaches.   Newer treatment options discussed such as Nurtec, though patient not interested at this time    Vitamin D deficiency  Vitamin D is 66 on weekly vitamin D 50,000 units         Review of Systems   Constitutional:  Negative for fatigue.   Respiratory:  Negative for shortness of breath.    Cardiovascular:  Negative for chest pain and palpitations.   Gastrointestinal:  Negative for constipation and diarrhea.   Genitourinary:  Negative for dysuria and urgency.   Neurological:  Negative for dizziness and headaches.       Objective   /78 (BP Location: Left arm, Patient Position: Sitting, BP Cuff Size: Adult)   Pulse 65   Wt 96.2 kg (212 lb)   SpO2 97%   BMI 30.42 kg/m²     Physical Exam  Vitals reviewed.    HENT:      Mouth/Throat:      Mouth: Mucous membranes are moist.   Eyes:      Conjunctiva/sclera: Conjunctivae normal.   Cardiovascular:      Rate and Rhythm: Normal rate and regular rhythm.      Heart sounds: Murmur heard.   Pulmonary:      Effort: Pulmonary effort is normal.      Breath sounds: Normal breath sounds. No rales.   Musculoskeletal:      Right lower leg: No edema.      Left lower leg: No edema.   Neurological:      Mental Status: He is alert.   Psychiatric:         Mood and Affect: Mood normal.         Assessment/Plan     Primary hypertension  Continue current regimen of atenolol, losartan, and spironolactone.  Maintain low-salt diet and regular exercise routine  Home blood pressure monitoring recommended, to bring readings to each visit.    Asymmetric septal hypertrophy  Treat blood pressure accordingly  Continue routine follow-up with cardiology, Dr. Jt Domingo    Hyperlipidemia  Continue proper diet, exercise  Continue atorvastatin 40 mg daily.    Seasonal allergic rhinitis  Continue Flonase as needed during allergy season    BPH status post HoLEP laser (1/13/2025), improved  Monitor expectantly    Migraine headaches with aura  (Episode of aura and headache during visit today)  Use of over-the-counter Excedrin Migraine discussed  Consideration for future use of newer medication such as Nurtec discussed, patient defers for now    Vitamin D deficiency  (Improved and vitamin D level at goal)  Continue vitamin D 50,000 IU weekly    Health maintenance  Regular exercise with goal of 120-150 minutes/week recommended    Follow-up  Wellness exam/physical in September  (Fasting lab work ordered to complete 3 to 7 days prior to visit)    Donovan Gooden MD

## 2025-04-12 NOTE — PATIENT INSTRUCTIONS
Primary hypertension  Continue current regimen of atenolol, losartan, and spironolactone.  Maintain low-salt diet and regular exercise routine  Home blood pressure monitoring recommended, to bring readings to each visit.    Asymmetric septal hypertrophy  Treat blood pressure accordingly  Continue routine follow-up with cardiology, Dr. Jt Domingo    Hyperlipidemia  Continue proper diet, exercise  Continue atorvastatin 40 mg daily.    Seasonal allergic rhinitis  Continue Flonase as needed during allergy season    BPH status post HoLEP laser (1/13/2025), improved  Monitor expectantly    Migraine headaches with aura  (Episode of aura and headache during visit today)  Use of over-the-counter Excedrin Migraine discussed  Consideration for future use of newer medication such as Nurtec discussed, patient defers for now    Vitamin D deficiency  (Improved and vitamin D level at goal)  Continue vitamin D 50,000 IU weekly    Health maintenance  Regular exercise with goal of 120-150 minutes/week recommended    Follow-up  Wellness exam/physical in September  (Fasting lab work ordered to complete 3 to 7 days prior to visit)

## 2025-04-15 ENCOUNTER — APPOINTMENT (OUTPATIENT)
Facility: CLINIC | Age: 55
End: 2025-04-15
Payer: COMMERCIAL

## 2025-07-31 ENCOUNTER — APPOINTMENT (OUTPATIENT)
Dept: UROLOGY | Facility: CLINIC | Age: 55
End: 2025-07-31
Payer: COMMERCIAL

## 2025-07-31 VITALS — HEART RATE: 51 BPM | SYSTOLIC BLOOD PRESSURE: 114 MMHG | DIASTOLIC BLOOD PRESSURE: 76 MMHG

## 2025-07-31 DIAGNOSIS — N40.1 BPH WITH OBSTRUCTION/LOWER URINARY TRACT SYMPTOMS: ICD-10-CM

## 2025-07-31 DIAGNOSIS — N13.8 BPH WITH OBSTRUCTION/LOWER URINARY TRACT SYMPTOMS: ICD-10-CM

## 2025-07-31 PROCEDURE — 3074F SYST BP LT 130 MM HG: CPT | Performed by: NURSE PRACTITIONER

## 2025-07-31 PROCEDURE — 3078F DIAST BP <80 MM HG: CPT | Performed by: NURSE PRACTITIONER

## 2025-07-31 PROCEDURE — 99212 OFFICE O/P EST SF 10 MIN: CPT | Performed by: NURSE PRACTITIONER

## 2025-07-31 PROCEDURE — 1036F TOBACCO NON-USER: CPT | Performed by: NURSE PRACTITIONER

## 2025-07-31 NOTE — PROGRESS NOTES
Subjective   Patient ID: Sekou Negron is a 55 y.o. male who presents for Benign Prostatic Hypertrophy.  Patient is status post H OL EP on January 14 with Dr. James.  He passed in office TOV a few days after. He states he has been doing very well. Notices a significant improvement in his stream. Now able to stand to urinate. Some mild dysuria which is improving daily. Very pleased             Review of Systems   All other systems reviewed and are negative.      Objective   Physical Exam  Vitals reviewed.     Alert and oriented x3  Moist mucous membranes  Breathes easily on room air  Abdomen soft, nondistended. Obese  No edema  No scleral icterus  No focal neurological deficits  Appears stated age, no acute distress    PVR=6ml     Assessment/Plan   Diagnoses and all orders for this visit:  BPH with obstruction/lower urinary tract symptoms  -     Post-Void Residual    Patient is very pleased with results s/p HOLEP. Follow up LINDA Wilson-CNP 07/31/25 9:31 AM

## 2025-08-18 DIAGNOSIS — Z00.00 WELLNESS EXAMINATION: ICD-10-CM

## 2025-10-22 ENCOUNTER — APPOINTMENT (OUTPATIENT)
Dept: PRIMARY CARE | Facility: CLINIC | Age: 55
End: 2025-10-22
Payer: COMMERCIAL

## 2026-02-12 ENCOUNTER — APPOINTMENT (OUTPATIENT)
Dept: CARDIOLOGY | Facility: CLINIC | Age: 56
End: 2026-02-12
Payer: COMMERCIAL

## (undated) DEVICE — Device

## (undated) DEVICE — ADAPTER, Y TUBING STERILE

## (undated) DEVICE — EVACUATOR, UROVAC

## (undated) DEVICE — TOWEL, SURGICAL, NEURO, O/R, 16 X 26, BLUE, STERILE

## (undated) DEVICE — TUBING, MORCELLATOR PUMP, DISPOSABLE

## (undated) DEVICE — SYRINGE, 50 CC, LUER LOCK

## (undated) DEVICE — PLUG, CATHETER

## (undated) DEVICE — IRRIGATION SET, CYSTOSCOPY, TURP, Y, CONTINUOUS, 81 IN

## (undated) DEVICE — CATHETER, URETHRAL, FOLEY, 3 WAY, BARDEX IC, 22 FR, 30 CC, SILVER LATEX

## (undated) DEVICE — TUBING, SUCTION, CONNECTING, STERILE 0.25 X 120 IN., LF

## (undated) DEVICE — COLLECTION BAG, FLUID, NON-STERILE

## (undated) DEVICE — BAG, DRAINAGE, ANTI-REFLUX CHAMBER, 2000ML

## (undated) DEVICE — SYRINGE, 60 CC, IRRIGATION, PISTON, CATH TIP, W/LUER ADAPTER,DISP

## (undated) DEVICE — BLADE, ROTATION MORCELLATOR, 4.8MM X 385MM, PIRHANA, DISPOSABLE